# Patient Record
Sex: MALE | Race: BLACK OR AFRICAN AMERICAN | Employment: OTHER | URBAN - METROPOLITAN AREA
[De-identification: names, ages, dates, MRNs, and addresses within clinical notes are randomized per-mention and may not be internally consistent; named-entity substitution may affect disease eponyms.]

---

## 2017-01-01 ENCOUNTER — OFFICE VISIT (OUTPATIENT)
Dept: GASTROENTEROLOGY | Facility: CLINIC | Age: 72
End: 2017-01-01
Attending: INTERNAL MEDICINE
Payer: MEDICARE

## 2017-01-01 ENCOUNTER — RESULTS ONLY (OUTPATIENT)
Dept: LAB | Age: 72
End: 2017-01-01

## 2017-01-01 ENCOUNTER — OFFICE VISIT (OUTPATIENT)
Dept: TRANSPLANT | Facility: CLINIC | Age: 72
End: 2017-01-01
Attending: INTERNAL MEDICINE
Payer: MEDICARE

## 2017-01-01 ENCOUNTER — OFFICE VISIT (OUTPATIENT)
Dept: TRANSPLANT | Facility: CLINIC | Age: 72
End: 2017-01-01
Attending: NURSE PRACTITIONER
Payer: MEDICARE

## 2017-01-01 ENCOUNTER — OFFICE VISIT - HEALTHEAST (OUTPATIENT)
Dept: GERIATRICS | Facility: CLINIC | Age: 72
End: 2017-01-01

## 2017-01-01 VITALS
OXYGEN SATURATION: 98 % | TEMPERATURE: 98.4 F | RESPIRATION RATE: 18 BRPM | SYSTOLIC BLOOD PRESSURE: 113 MMHG | DIASTOLIC BLOOD PRESSURE: 72 MMHG | HEART RATE: 98 BPM

## 2017-01-01 VITALS
RESPIRATION RATE: 14 BRPM | OXYGEN SATURATION: 99 % | SYSTOLIC BLOOD PRESSURE: 100 MMHG | TEMPERATURE: 98.3 F | DIASTOLIC BLOOD PRESSURE: 55 MMHG | HEART RATE: 63 BPM

## 2017-01-01 VITALS
RESPIRATION RATE: 16 BRPM | OXYGEN SATURATION: 97 % | TEMPERATURE: 98.8 F | DIASTOLIC BLOOD PRESSURE: 60 MMHG | HEART RATE: 69 BPM | SYSTOLIC BLOOD PRESSURE: 97 MMHG

## 2017-01-01 DIAGNOSIS — E78.5 HYPERLIPIDEMIA: ICD-10-CM

## 2017-01-01 DIAGNOSIS — I10 HYPERTENSION: ICD-10-CM

## 2017-01-01 DIAGNOSIS — Z51.81 ANTICOAGULATION GOAL OF INR 1.5 TO 2.5: Primary | ICD-10-CM

## 2017-01-01 DIAGNOSIS — Z79.01 ANTICOAGULATION GOAL OF INR 1.5 TO 2.5: Primary | ICD-10-CM

## 2017-01-01 DIAGNOSIS — B18.1 CHRONIC VIRAL HEPATITIS B WITHOUT DELTA AGENT AND WITHOUT COMA (H): Primary | ICD-10-CM

## 2017-01-01 DIAGNOSIS — B18.1 CHRONIC VIRAL HEPATITIS B WITHOUT DELTA AGENT AND WITHOUT COMA (H): ICD-10-CM

## 2017-01-01 DIAGNOSIS — B19.10 HEPATITIS B: ICD-10-CM

## 2017-01-01 DIAGNOSIS — C93.10 CHRONIC MYELOMONOCYTIC LEUKEMIA (H): ICD-10-CM

## 2017-01-01 DIAGNOSIS — F03.90 DEMENTIA (H): ICD-10-CM

## 2017-01-01 DIAGNOSIS — C93.12 CHRONIC MYELOMONOCYTIC LEUKEMIA, IN RELAPSE (H): Primary | ICD-10-CM

## 2017-01-01 DIAGNOSIS — E11.40 DIABETES MELLITUS WITH NEUROPATHY (H): ICD-10-CM

## 2017-01-01 DIAGNOSIS — D46.9 MDS (MYELODYSPLASTIC SYNDROME) (H): ICD-10-CM

## 2017-01-01 LAB
ALBUMIN SERPL-MCNC: 3.1 G/DL (ref 3.4–5)
ALP SERPL-CCNC: 89 U/L (ref 40–150)
ALT SERPL W P-5'-P-CCNC: 20 U/L (ref 0–70)
ANION GAP SERPL CALCULATED.3IONS-SCNC: 7 MMOL/L (ref 3–14)
AST SERPL W P-5'-P-CCNC: 13 U/L (ref 0–45)
BILIRUB DIRECT SERPL-MCNC: 0.2 MG/DL (ref 0–0.2)
BILIRUB SERPL-MCNC: 1.6 MG/DL (ref 0.2–1.3)
BUN SERPL-MCNC: 16 MG/DL (ref 7–30)
CALCIUM SERPL-MCNC: 8.9 MG/DL (ref 8.5–10.1)
CHLORIDE SERPL-SCNC: 102 MMOL/L (ref 94–109)
CO2 SERPL-SCNC: 27 MMOL/L (ref 20–32)
COPATH REPORT: NORMAL
CREAT SERPL-MCNC: 0.8 MG/DL (ref 0.66–1.25)
ERYTHROCYTE [DISTWIDTH] IN BLOOD BY AUTOMATED COUNT: 13.1 % (ref 10–15)
ERYTHROCYTE [DISTWIDTH] IN BLOOD BY AUTOMATED COUNT: 14.1 % (ref 10–15)
GFR SERPL CREATININE-BSD FRML MDRD: >90 ML/MIN/1.7M2
GLUCOSE SERPL-MCNC: 190 MG/DL (ref 70–99)
HBV DNA SERPL NAA+PROBE-ACNC: NORMAL [IU]/ML
HBV DNA SERPL NAA+PROBE-LOG IU: NORMAL {LOG_IU}/ML
HCT VFR BLD AUTO: 39 % (ref 40–53)
HCT VFR BLD AUTO: 39.6 % (ref 40–53)
HGB BLD-MCNC: 12.5 G/DL (ref 13.3–17.7)
HGB BLD-MCNC: 13.1 G/DL (ref 13.3–17.7)
INR PPP: 1.19 (ref 0.86–1.14)
INR PPP: 1.68 (ref 0.86–1.14)
MCH RBC QN AUTO: 32.2 PG (ref 26.5–33)
MCH RBC QN AUTO: 34.7 PG (ref 26.5–33)
MCHC RBC AUTO-ENTMCNC: 32.1 G/DL (ref 31.5–36.5)
MCHC RBC AUTO-ENTMCNC: 33.1 G/DL (ref 31.5–36.5)
MCV RBC AUTO: 101 FL (ref 78–100)
MCV RBC AUTO: 105 FL (ref 78–100)
PLATELET # BLD AUTO: 201 10E9/L (ref 150–450)
PLATELET # BLD AUTO: 216 10E9/L (ref 150–450)
POTASSIUM SERPL-SCNC: 4 MMOL/L (ref 3.4–5.3)
PROT SERPL-MCNC: 7.1 G/DL (ref 6.8–8.8)
RBC # BLD AUTO: 3.78 10E12/L (ref 4.4–5.9)
RBC # BLD AUTO: 3.88 10E12/L (ref 4.4–5.9)
SODIUM SERPL-SCNC: 136 MMOL/L (ref 133–144)
WBC # BLD AUTO: 6.2 10E9/L (ref 4–11)
WBC # BLD AUTO: 6.9 10E9/L (ref 4–11)

## 2017-01-01 PROCEDURE — 88237 TISSUE CULTURE BONE MARROW: CPT | Performed by: INTERNAL MEDICINE

## 2017-01-01 PROCEDURE — 88305 TISSUE EXAM BY PATHOLOGIST: CPT | Performed by: INTERNAL MEDICINE

## 2017-01-01 PROCEDURE — 90713 POLIOVIRUS IPV SC/IM: CPT | Mod: ZF | Performed by: INTERNAL MEDICINE

## 2017-01-01 PROCEDURE — 81267 CHIMERISM ANAL NO CELL SELEC: CPT | Performed by: INTERNAL MEDICINE

## 2017-01-01 PROCEDURE — 38221 DX BONE MARROW BIOPSIES: CPT

## 2017-01-01 PROCEDURE — 40001005 ZZHCL STATISTIC FLOW >15 ABY TC 88189: Performed by: INTERNAL MEDICINE

## 2017-01-01 PROCEDURE — 90716 VAR VACCINE LIVE SUBQ: CPT | Mod: ZF | Performed by: INTERNAL MEDICINE

## 2017-01-01 PROCEDURE — 88161 CYTOPATH SMEAR OTHER SOURCE: CPT | Mod: XU | Performed by: INTERNAL MEDICINE

## 2017-01-01 PROCEDURE — 90670 PCV13 VACCINE IM: CPT | Mod: ZF | Performed by: INTERNAL MEDICINE

## 2017-01-01 PROCEDURE — 40000424 ZZHCL STATISTIC BONE MARROW CORE PERF TC 38221: Performed by: INTERNAL MEDICINE

## 2017-01-01 PROCEDURE — 40000611 ZZHCL STATISTIC MORPHOLOGY W/INTERP HEMEPATH TC 85060: Performed by: INTERNAL MEDICINE

## 2017-01-01 PROCEDURE — 88280 CHROMOSOME KARYOTYPE STUDY: CPT | Performed by: INTERNAL MEDICINE

## 2017-01-01 PROCEDURE — 40000951 ZZHCL STATISTIC BONE MARROW INTERP TC 85097: Performed by: INTERNAL MEDICINE

## 2017-01-01 PROCEDURE — 90707 MMR VACCINE SC: CPT | Mod: ZF | Performed by: INTERNAL MEDICINE

## 2017-01-01 PROCEDURE — 85027 COMPLETE CBC AUTOMATED: CPT | Performed by: INTERNAL MEDICINE

## 2017-01-01 PROCEDURE — 80076 HEPATIC FUNCTION PANEL: CPT | Performed by: INTERNAL MEDICINE

## 2017-01-01 PROCEDURE — G0364 BONE MARROW ASPIRATE &BIOPSY: HCPCS

## 2017-01-01 PROCEDURE — 85610 PROTHROMBIN TIME: CPT | Performed by: INTERNAL MEDICINE

## 2017-01-01 PROCEDURE — 88264 CHROMOSOME ANALYSIS 20-25: CPT | Performed by: INTERNAL MEDICINE

## 2017-01-01 PROCEDURE — 25000128 H RX IP 250 OP 636: Mod: ZF | Performed by: INTERNAL MEDICINE

## 2017-01-01 PROCEDURE — 36415 COLL VENOUS BLD VENIPUNCTURE: CPT | Performed by: INTERNAL MEDICINE

## 2017-01-01 PROCEDURE — 90472 IMMUNIZATION ADMIN EACH ADD: CPT

## 2017-01-01 PROCEDURE — 25000125 ZZHC RX 250: Mod: ZF | Performed by: INTERNAL MEDICINE

## 2017-01-01 PROCEDURE — G0009 ADMIN PNEUMOCOCCAL VACCINE: HCPCS

## 2017-01-01 PROCEDURE — 90715 TDAP VACCINE 7 YRS/> IM: CPT | Mod: ZF | Performed by: INTERNAL MEDICINE

## 2017-01-01 PROCEDURE — 00000058 ZZHCL STATISTIC BONE MARROW ASP PERF TC 38220: Performed by: INTERNAL MEDICINE

## 2017-01-01 PROCEDURE — 88185 FLOWCYTOMETRY/TC ADD-ON: CPT | Performed by: INTERNAL MEDICINE

## 2017-01-01 PROCEDURE — 99213 OFFICE O/P EST LOW 20 MIN: CPT | Mod: ZF

## 2017-01-01 PROCEDURE — 88311 DECALCIFY TISSUE: CPT | Performed by: INTERNAL MEDICINE

## 2017-01-01 PROCEDURE — 80048 BASIC METABOLIC PNL TOTAL CA: CPT | Performed by: INTERNAL MEDICINE

## 2017-01-01 PROCEDURE — 99212 OFFICE O/P EST SF 10 MIN: CPT | Mod: ZF

## 2017-01-01 PROCEDURE — 88184 FLOWCYTOMETRY/ TC 1 MARKER: CPT | Performed by: INTERNAL MEDICINE

## 2017-01-01 PROCEDURE — 90648 HIB PRP-T VACCINE 4 DOSE IM: CPT | Mod: ZF | Performed by: INTERNAL MEDICINE

## 2017-01-01 PROCEDURE — 85610 PROTHROMBIN TIME: CPT | Performed by: NURSE PRACTITIONER

## 2017-01-01 PROCEDURE — 88271 CYTOGENETICS DNA PROBE: CPT | Performed by: INTERNAL MEDICINE

## 2017-01-01 PROCEDURE — 88275 CYTOGENETICS 100-300: CPT | Performed by: INTERNAL MEDICINE

## 2017-01-01 PROCEDURE — 99212 OFFICE O/P EST SF 10 MIN: CPT | Mod: 25

## 2017-01-01 PROCEDURE — 00000161 ZZHCL STATISTIC H-SPHEME PROCESS B/S: Performed by: INTERNAL MEDICINE

## 2017-01-01 PROCEDURE — 87517 HEPATITIS B DNA QUANT: CPT | Performed by: INTERNAL MEDICINE

## 2017-01-01 RX ADMIN — HAEMOPHILUS B POLYSACCHARIDE CONJUGATE VACCINE FOR INJ 0.5 ML: RECON SOLN at 10:26

## 2017-01-01 RX ADMIN — VARICELLA VIRUS VACCINE LIVE 0.5 ML: 1350 INJECTION, POWDER, LYOPHILIZED, FOR SUSPENSION SUBCUTANEOUS at 10:20

## 2017-01-01 RX ADMIN — POLIOVIRUS TYPE 1 ANTIGEN (FORMALDEHYDE INACTIVATED), POLIOVIRUS TYPE 2 ANTIGEN (FORMALDEHYDE INACTIVATED), AND POLIOVIRUS TYPE 3 ANTIGEN (FORMALDEHYDE INACTIVATED) 0.5 ML: 40; 8; 32 INJECTION, SUSPENSION INTRAMUSCULAR at 10:24

## 2017-01-01 RX ADMIN — PNEUMOCOCCAL 13-VALENT CONJUGATE VACCINE 0.5 ML: 2.2; 2.2; 2.2; 2.2; 2.2; 4.4; 2.2; 2.2; 2.2; 2.2; 2.2; 2.2; 2.2 INJECTION, SUSPENSION INTRAMUSCULAR at 10:22

## 2017-01-01 RX ADMIN — TETANUS TOXOID, REDUCED DIPHTHERIA TOXOID AND ACELLULAR PERTUSSIS VACCINE, ADSORBED 0.5 ML: 5; 2.5; 8; 8; 2.5 SUSPENSION INTRAMUSCULAR at 10:23

## 2017-01-01 RX ADMIN — MEASLES, MUMPS, AND RUBELLA VIRUS VACCINE LIVE 0.5 ML: 1000; 12500; 1000 INJECTION, POWDER, LYOPHILIZED, FOR SUSPENSION SUBCUTANEOUS at 10:28

## 2017-01-01 ASSESSMENT — PAIN SCALES - GENERAL
PAINLEVEL: NO PAIN (0)

## 2017-01-18 ENCOUNTER — OFFICE VISIT (OUTPATIENT)
Dept: ENDOCRINOLOGY | Facility: CLINIC | Age: 72
End: 2017-01-18

## 2017-01-18 VITALS — HEART RATE: 65 BPM | DIASTOLIC BLOOD PRESSURE: 66 MMHG | SYSTOLIC BLOOD PRESSURE: 118 MMHG

## 2017-01-18 DIAGNOSIS — E11.65 TYPE 2 DIABETES MELLITUS WITH HYPERGLYCEMIA, WITH LONG-TERM CURRENT USE OF INSULIN (H): Primary | ICD-10-CM

## 2017-01-18 DIAGNOSIS — Z79.4 TYPE 2 DIABETES MELLITUS WITH HYPERGLYCEMIA, WITH LONG-TERM CURRENT USE OF INSULIN (H): Primary | ICD-10-CM

## 2017-01-18 ASSESSMENT — PAIN SCALES - GENERAL: PAINLEVEL: NO PAIN (0)

## 2017-01-18 NOTE — MR AVS SNAPSHOT
After Visit Summary   1/18/2017    Garcia Lara    MRN: [id number]           Patient Information     Date Of Birth          1945        Visit Information        Provider Department      1/18/2017 12:00 PM Cherry Crowder PA-C [facility]         Follow-ups after your visit        Your next 10 appointments already scheduled     Mar 15, 2017 11:00 AM   (Arrive by 10:30 AM)   Return General Liver with MD MARGARITA Herrera [facility] Hepatology ([facility])    [address] [place] [id number] [phone]            Apr 18, 2017 11:30 AM   (Arrive by 11:15 AM)   RETURN DIABETES with KENNETH [facility] Endocrinology ([facility])    [address] [place] [id number] [phone]              Who to contact     Please call your clinic at [phone] to:    Ask questions about your health    Make or cancel appointments    Discuss your medicines    Learn about your test results    Speak to your doctor   If you have compliments or concerns about an experience at your clinic, or if you wish to file a complaint, please contact [facility] Patient Relations at [phone] or email us at [email or web address]         Additional Information About Your Visit        Care EveryWhere ID     This is your Care EveryWhere ID. This could be used by other organizations to access your [place] medical records  [id number]        Your Vitals Were     Pulse                   65            Blood Pressure from Last 3 Encounters:   01/18/17 118/66   12/12/16 107/71   11/15/16 129/65    Weight from Last 3 Encounters:   12/12/16 112.492 kg (248 lb)   09/16/16 109.77 kg (242 lb)   08/29/16 109.7 kg (241 lb 13.5 oz)              Today, you had the following     No orders found for display         Today's Medication Changes          These changes  are accurate as of: 1/18/17 12:41 PM.  If you have any questions, ask your nurse or doctor.               These medicines have changed or have updated prescriptions.        Dose/Directions    magnesium oxide 400 MG tablet   Commonly known as:  MAG-OX   This may have changed:  how much to take   Used for:  Hypomagnesemia        Dose:  800 mg   Take 2 tablets (800 mg) by mouth 3 times daily   Quantity:  180 tablet   Refills:  3                Primary Care Provider Office Phone # Fax #    Arnulfo Dey [phone]       [place] [address] [address] [facility] [address]        Thank you!     Thank you for choosing [facility] ENDOCRINOLOG[facility]  for your care. Our goal is always to provide you with excellent care. Hearing back from our patients is one way we can continue to improve our services. Please take a few minutes to complete the written survey that you may receive in the mail after your visit with us. Thank you!             Your Updated Medication List - Protect others around you: Learn how to safely use, store and throw away your medicines at www.disposemymeds.org.          This list is accurate as of: 1/18/17 12:41 PM.  Always use your most recent med list.                   Brand Name Dispense Instructions for use    * ACCU-CHEK SMARTVIEW test strip   Generic drug:  blood glucose monitoring      by In Vitro route 4 times daily Use to test blood sugar 4 times daily or as directed.       * blood glucose monitoring test strip    ACCU-CHEK JASON PLUS    150 each    Use to test blood sugar 4 times daily or as directed.  Ok to substitute alternative if insurance prefers.       ALDACTONE PO      Take 25 mg by mouth daily       Alpha Lipoic Acid 200 MG Caps      Take 2 capsules by mouth daily       blood glucose lancing device      Device to be used with lancets.       blood glucose monitoring lancets     1 Box    Use to test blood sugar 4 times daily or as directed.       calcium polycarbophil 625 MG  "tablet    FIBERCON     Take 2 tablets by mouth 2 times daily       carboxymethylcellulose 1 % ophthalmic solution    CELLUVISC/REFRESH LIQUIGEL    1 Bottle    Place 1 drop into both eyes 3 times daily       ciclopirox 0.77 % cream    LOPROX    90 g    Apply topically 2 times daily To feet and toenails.       COLACE PO      Take 50 mg by mouth 2 times daily       fiber modular packet      2 times daily (with meals)       * LASIX PO      Take 40 mg by mouth At noon       * furosemide 80 MG tablet    LASIX    30 tablet    Take 1 tablet (80 mg) by mouth daily       glucose 40 % Gel gel      Take 15-30 g by mouth every 15 minutes as needed for low blood sugar       insulin glargine 100 UNIT/ML injection    LANTUS    3 mL    Inject 50 Units Subcutaneous At Bedtime       * insulin lispro 100 UNIT/ML injection    HumaLOG KWIKpen    3 mL    Dosing per separately attached sliding scale.       * HumaLOG KWIKpen 100 UNIT/ML injection   Generic drug:  insulin lispro      Novolog 9 units with each meal ( based on 1 unit per 5 grams of CHO)       magnesium oxide 400 MG tablet    MAG-OX    180 tablet    Take 2 tablets (800 mg) by mouth 3 times daily       metoprolol 50 MG tablet    LOPRESSOR     Take 75 mg in a.m. and 50mg in pm.       multivitamins with minerals Liqd liquid     1 Bottle    15 mLs by Per Feeding Tube route daily       OXYCODONE HCL PO      Take 5 mg by mouth every 6 hours as needed (1/2 pill)       pen needles 5/16\" 31G X 8 MM Misc          PROTONIX 40 MG EC tablet   Generic drug:  pantoprazole      Take 40 mg by mouth daily       sennosides 8.6 MG tablet    SENOKOT     Take 1 tablet by mouth daily       tenofovir 300 MG tablet    VIREAD    30 tablet    Take 1 tablet (300 mg) by mouth daily       traMADol 50 MG tablet    ULTRAM    28 tablet    Take 2 tablets (100 mg) by mouth every 8 hours as needed for moderate pain       warfarin 10 MG tablet    COUMADIN    30 tablet    Take 0.5 tablets (5 mg) by mouth daily    "    * Notice:  This list has 6 medication(s) that are the same as other medications prescribed for you. Read the directions carefully, and ask your doctor or other care provider to review them with you.

## 2017-01-18 NOTE — PROGRESS NOTES
HPI  Garcia Lara is a 71 year old male with type 2 diabetes mellitus here today for a follow up visit.  Pt's wife is present today and pt currently resides at [facility] in [place].  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   He was hospitalized last fall and found to have hepatitis B.  For his diabetes, he is on Lantus 50 units SQ at , Humalog 9 units with meals with correction scale ( 1 unit/25 for BG > 140 ).  His A1C is 7.4 % today.  I reviewed his blood sugar logsheet today and most of his bs are < 200 and no blood sugar values less than 70.  On ROS today, he has no complaints today.  He is being seen for a right foot wound.  No fevers or chills.  He denies visual changes, n/v, SOB at rest or cough.  No chest pain, abd pain or diarrhea.  He has numbness in both feet from his neuropathy.    ROS  Please see under HPI.    Allergies  Allergies   Allergen Reactions     Vancomycin Swelling     Patient developed lip swelling ~1-2 days after vancomycin was started at [place], repeated with test dose, lips swelled       Medications  Current Outpatient Prescriptions   Medication Sig Dispense Refill     OXYCODONE HCL PO Take 5 mg by mouth every 6 hours as needed (1/2 pill)       traMADol (ULTRAM) 50 MG tablet Take 2 tablets (100 mg) by mouth every 8 hours as needed for moderate pain 28 tablet 1     insulin lispro (HUMALOG KWIKPEN) 100 UNIT/ML soln Dosing per separately attached sliding scale. 3 mL 11     insulin glargine (LANTUS) 100 UNIT/ML PEN Inject 50 Units Subcutaneous At Bedtime 3 mL 11     insulin lispro (HUMALOG KWIKPEN) 100 UNIT/ML soln Novolog 9 units with each meal ( based on 1 unit per 5 grams of CHO)        tenofovir (VIREAD) 300 MG tablet Take 1 tablet (300 mg) by mouth daily 30 tablet 3     warfarin (COUMADIN) **10 MG** tablet  "Take 0.5 tablets (5 mg) by mouth daily 30 tablet 3     calcium polycarbophil (FIBERCON) 625 MG tablet Take 2 tablets by mouth 2 times daily       Alpha Lipoic Acid 200 MG CAPS Take 2 capsules by mouth daily       metoprolol (LOPRESSOR) 50 MG tablet Take 75 mg in a.m. and 50mg in pm.       pantoprazole (PROTONIX) 40 MG enteric coated tablet Take 40 mg by mouth daily       Furosemide (LASIX PO) Take 40 mg by mouth At noon       Spironolactone (ALDACTONE PO) Take 25 mg by mouth daily       fiber modular (NUTRISOURCE FIBER) packet 2 times daily (with meals)       Docusate Sodium (COLACE PO) Take 50 mg by mouth 2 times daily       sennosides (SENOKOT) 8.6 MG tablet Take 1 tablet by mouth daily       blood glucose monitoring (ACCU-CHEK JASON PLUS) test strip Use to test blood sugar 4 times daily or as directed.  Ok to substitute alternative if insurance prefers. 150 each 6     blood glucose monitoring (SOFTCLIX) lancets Use to test blood sugar 4 times daily or as directed. 1 Box 6     ciclopirox (LOPROX) 0.77 % cream Apply topically 2 times daily To feet and toenails. 90 g 6     magnesium oxide (MAG-OX) 400 MG tablet Take 2 tablets (800 mg) by mouth 3 times daily (Patient taking differently: Take 400 mg by mouth 3 times daily ) 180 tablet 3     furosemide (LASIX) 80 MG tablet Take 1 tablet (80 mg) by mouth daily 30 tablet 3     multivitamins with minerals (CERTAVITE) LIQD 15 mLs by Per Feeding Tube route daily 1 Bottle 3     carboxymethylcellulose (CELLUVISC/REFRESH LIQUIGEL) 1 % ophthalmic solution Place 1 drop into both eyes 3 times daily 1 Bottle 3     glucose 40 % GEL Take 15-30 g by mouth every 15 minutes as needed for low blood sugar       blood glucose (ACCU-CHEK SMARTVIEW) test strip by In Vitro route 4 times daily Use to test blood sugar 4 times daily or as directed.       blood glucose (ACCU-CHEK FASTCLIX) lancing device Device to be used with lancets.       Insulin Pen Needle (PEN NEEDLES 5/16\") 31G X 8 MM MISC "          Family History  family history includes Asthma in his sister and son; Coronary Artery Disease in his brother and brother; Prostate Cancer in his brother. There is no history of Liver Disease.    Social History  Smoke: none.  ETOH: none.  .  He resides in a care center.    Past Medical History  Past Medical History   Diagnosis Date     CMML (chronic myelomonocytic leukaemia) 2014     Hypertension      Hyperlipidemia      Type 2 diabetes mellitus (H) 1995     on insulin     Diabetic peripheral neuropathy associated with type 2 diabetes mellitus (H)      H/O agent Orange exposure      during [place]     H/O angina pectoris 1986     no further treatment necessary     Pneumonia 12/2013     required hospitalization     Metabolic encephalopathy 8/31/2015     Chronic hepatitis B (H)      H/O stem cell transplant (H) 8/2015     H/O deep venous thrombosis      Past Surgical History   Procedure Laterality Date     Hc pressure gradient measurement, initial vessel  1986     Colonoscopy         Physical Exam  /66 mmHg  Pulse 65  There is no weight on file to calculate BMI.    RESULTS  CREATININE   Date Value Ref Range Status   12/12/2016 0.90 0.66 - 1.25 mg/dL Final   07/10/2015 0.92 0.66 - 1.25 mg/dL Final     GFR ESTIMATE   Date Value Ref Range Status   12/12/2016 83 >60 mL/min/1.7m2 Final     Comment:     Non  GFR Calc     HEMOGLOBIN A1C   Date Value Ref Range Status   03/03/2016 7.3* 4.3 - 6.0 % Final     POTASSIUM   Date Value Ref Range Status   12/12/2016 3.5 3.4 - 5.3 mmol/L Final     ALT   Date Value Ref Range Status   12/12/2016 31 0 - 70 U/L Final     AST   Date Value Ref Range Status   12/12/2016 24 0 - 45 U/L Final     TSH   Date Value Ref Range Status   09/16/2016 1.30 mcU/mL Final     T4 FREE   Date Value Ref Range Status   09/07/2015 1.26 0.76 - 1.46 ng/dL Final       CHOLESTEROL   Date Value Ref Range Status   09/16/2016 163 mg/dL Final     HDL CHOLESTEROL   Date Value  Ref Range Status   09/16/2016 29 mg/dL Final     LDL CHOLESTEROL CALCULATED   Date Value Ref Range Status   09/16/2016 84 mg/dL Final     TRIGLYCERIDES   Date Value Ref Range Status   10/06/2016 152* <150 mg/dL Final     Comment:     Borderline high:  150-199 mg/dl   High:             200-499 mg/dl   Very high:       >499 mg/dl     09/28/2016 212* <150 mg/dL Final     Comment:     Borderline high:  150-199 mg/dl   High:             200-499 mg/dl   Very high:       >499 mg/dl       A1C      7.4   1/18/2017  A1C      6.5   6/23/2016  A1C      7.3   3/3/2016  A1C      7.3   9/6/2015    ASSESSMENT/PLAN:    1.  TYPE 2 DIABETES MELLITUS: Type 2 diabetes mellitus complicated by peripheral neuropathy and right foot wound.  Will continue current insulin doses.  He requires skilled nursing care to help care for him at this time.  Will refer for Oph exam.  Pt is normotensive.  Most recent creat was 0.90 with GFR 83 mL/min in Dec 2016.  Pt's TSH was normal in 9/2016.  Pt's LDL was 84 in 9/2016.  Will plan to check urine microalbuminuria next visit.    2. NEUROPATHY/FOOT CARE: Pt is being followed for right foot wound.    3.  HTN:Stable on current Rxs.    4.  MDS: S/P bone marrow transplant doing well.    5.  HEPATITIS B: Recent dx and being followed here.    6.  Return to Endocrine Clinic to see me in 3 months.

## 2017-01-18 NOTE — Clinical Note
1/18/2017       RE: Garcia Lara  [address]  [place] [address]     Dear Colleague,    Thank you for referring your patient, Garcia Lara, to the [facility] ENDOCRINOLOGY at [facility]. Please see a copy of my visit note below.    HPI  Garcia Lara is a 71 year old male with type 2 diabetes mellitus here today for a follow up visit.  Pt's wife is present today and pt currently resides at [facility] in [place].  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   He was hospitalized last fall and found to have hepatitis B.  For his diabetes, he is on Lantus 50 units SQ at hs, Humalog 9 units with meals with correction scale ( 1 unit/25 for BG > 140 ).  His A1C is 7.4 % today.  I reviewed his blood sugar logsheet today and most of his bs are < 200 and no blood sugar values less than 70.  On ROS today, he has no complaints today.  He is being seen for a right foot wound.  No fevers or chills.  He denies visual changes, n/v, SOB at rest or cough.  No chest pain, abd pain or diarrhea.  He has numbness in both feet from his neuropathy.    ROS  Please see under HPI.    Allergies  Allergies   Allergen Reactions     Vancomycin Swelling     Patient developed lip swelling ~1-2 days after vancomycin was started at [place], repeated with test dose, lips swelled       Medications  Current Outpatient Prescriptions   Medication Sig Dispense Refill     OXYCODONE HCL PO Take 5 mg by mouth every 6 hours as needed (1/2 pill)       traMADol (ULTRAM) 50 MG tablet Take 2 tablets (100 mg) by mouth every 8 hours as needed for moderate pain 28 tablet 1     insulin lispro (HUMALOG KWIKPEN) 100 UNIT/ML soln Dosing per separately attached sliding scale. 3 mL 11     insulin glargine (LANTUS) 100 UNIT/ML PEN Inject 50 Units  Subcutaneous At Bedtime 3 mL 11     insulin lispro (HUMALOG KWIKPEN) 100 UNIT/ML soln Novolog 9 units with each meal ( based on 1 unit per 5 grams of CHO)        tenofovir (VIREAD) 300 MG tablet Take 1 tablet (300 mg) by mouth daily 30 tablet 3     warfarin (COUMADIN) **10 MG** tablet Take 0.5 tablets (5 mg) by mouth daily 30 tablet 3     calcium polycarbophil (FIBERCON) 625 MG tablet Take 2 tablets by mouth 2 times daily       Alpha Lipoic Acid 200 MG CAPS Take 2 capsules by mouth daily       metoprolol (LOPRESSOR) 50 MG tablet Take 75 mg in a.m. and 50mg in pm.       pantoprazole (PROTONIX) 40 MG enteric coated tablet Take 40 mg by mouth daily       Furosemide (LASIX PO) Take 40 mg by mouth At noon       Spironolactone (ALDACTONE PO) Take 25 mg by mouth daily       fiber modular (NUTRISOURCE FIBER) packet 2 times daily (with meals)       Docusate Sodium (COLACE PO) Take 50 mg by mouth 2 times daily       sennosides (SENOKOT) 8.6 MG tablet Take 1 tablet by mouth daily       blood glucose monitoring (ACCU-CHEK JASON PLUS) test strip Use to test blood sugar 4 times daily or as directed.  Ok to substitute alternative if insurance prefers. 150 each 6     blood glucose monitoring (SOFTCLIX) lancets Use to test blood sugar 4 times daily or as directed. 1 Box 6     ciclopirox (LOPROX) 0.77 % cream Apply topically 2 times daily To feet and toenails. 90 g 6     magnesium oxide (MAG-OX) 400 MG tablet Take 2 tablets (800 mg) by mouth 3 times daily (Patient taking differently: Take 400 mg by mouth 3 times daily ) 180 tablet 3     furosemide (LASIX) 80 MG tablet Take 1 tablet (80 mg) by mouth daily 30 tablet 3     multivitamins with minerals (CERTAVITE) LIQD 15 mLs by Per Feeding Tube route daily 1 Bottle 3     carboxymethylcellulose (CELLUVISC/REFRESH LIQUIGEL) 1 % ophthalmic solution Place 1 drop into both eyes 3 times daily 1 Bottle 3     glucose 40 % GEL Take 15-30 g by mouth every 15 minutes as needed for low blood sugar    "    blood glucose (ACCU-CHEK SMARTVIEW) test strip by In Vitro route 4 times daily Use to test blood sugar 4 times daily or as directed.       blood glucose (ACCU-CHEK FASTCLIX) lancing device Device to be used with lancets.       Insulin Pen Needle (PEN NEEDLES 5/16\") 31G X 8 MM MISC          Family History  family history includes Asthma in his sister and son; Coronary Artery Disease in his brother and brother; Prostate Cancer in his brother. There is no history of Liver Disease.    Social History  Smoke: none.  ETOH: none.  .  He resides in a care center.    Past Medical History  Past Medical History   Diagnosis Date     CMML (chronic myelomonocytic leukaemia) 2014     Hypertension      Hyperlipidemia      Type 2 diabetes mellitus (H) 1995     on insulin     Diabetic peripheral neuropathy associated with type 2 diabetes mellitus (H)      H/O agent Orange exposure      during [place]     H/O angina pectoris 1986     no further treatment necessary     Pneumonia 12/2013     required hospitalization     Metabolic encephalopathy 8/31/2015     Chronic hepatitis B (H)      H/O stem cell transplant (H) 8/2015     H/O deep venous thrombosis      Past Surgical History   Procedure Laterality Date     Hc pressure gradient measurement, initial vessel  1986     Colonoscopy         Physical Exam  /66 mmHg  Pulse 65  There is no weight on file to calculate BMI.    RESULTS  CREATININE   Date Value Ref Range Status   12/12/2016 0.90 0.66 - 1.25 mg/dL Final   07/10/2015 0.92 0.66 - 1.25 mg/dL Final     GFR ESTIMATE   Date Value Ref Range Status   12/12/2016 83 >60 mL/min/1.7m2 Final     Comment:     Non  GFR Calc     HEMOGLOBIN A1C   Date Value Ref Range Status   03/03/2016 7.3* 4.3 - 6.0 % Final     POTASSIUM   Date Value Ref Range Status   12/12/2016 3.5 3.4 - 5.3 mmol/L Final     ALT   Date Value Ref Range Status   12/12/2016 31 0 - 70 U/L Final     AST   Date Value Ref Range Status   12/12/2016 " 24 0 - 45 U/L Final     TSH   Date Value Ref Range Status   09/16/2016 1.30 mcU/mL Final     T4 FREE   Date Value Ref Range Status   09/07/2015 1.26 0.76 - 1.46 ng/dL Final       CHOLESTEROL   Date Value Ref Range Status   09/16/2016 163 mg/dL Final     HDL CHOLESTEROL   Date Value Ref Range Status   09/16/2016 29 mg/dL Final     LDL CHOLESTEROL CALCULATED   Date Value Ref Range Status   09/16/2016 84 mg/dL Final     TRIGLYCERIDES   Date Value Ref Range Status   10/06/2016 152* <150 mg/dL Final     Comment:     Borderline high:  150-199 mg/dl   High:             200-499 mg/dl   Very high:       >499 mg/dl     09/28/2016 212* <150 mg/dL Final     Comment:     Borderline high:  150-199 mg/dl   High:             200-499 mg/dl   Very high:       >499 mg/dl       A1C      7.4   1/18/2017  A1C      6.5   6/23/2016  A1C      7.3   3/3/2016  A1C      7.3   9/6/2015    ASSESSMENT/PLAN:    1.  TYPE 2 DIABETES MELLITUS: Type 2 diabetes mellitus complicated by peripheral neuropathy and right foot wound.  Will continue current insulin doses.  He requires skilled nursing care to help care for him at this time.  Will refer for Oph exam.  Pt is normotensive.  Most recent creat was 0.90 with GFR 83 mL/min in Dec 2016.  Pt's TSH was normal in 9/2016.  Pt's LDL was 84 in 9/2016.  Will plan to check urine microalbuminuria next visit.    2. NEUROPATHY/FOOT CARE: Pt is being followed for right foot wound.    3.  HTN:Stable on current Rxs.    4.  MDS: S/P bone marrow transplant doing well.    5.  HEPATITIS B: Recent dx and being followed here.    6.  Return to Endocrine Clinic to see me in 3 month    Again, thank you for allowing me to participate in the care of your patient.      Sincerely,    Cherry Crowder PA-C

## 2017-01-18 NOTE — NURSING NOTE
Chief Complaint   Patient presents with     RECHECK     F/U TYPE II DM     Radha Ferro, [facility]  Endocrinology & Diabetes 3G

## 2017-02-22 DIAGNOSIS — B19.10 HEPATITIS B VIRUS INFECTION, UNSPECIFIED CHRONICITY: Primary | ICD-10-CM

## 2017-03-08 ENCOUNTER — PRE VISIT (OUTPATIENT)
Dept: GASTROENTEROLOGY | Facility: CLINIC | Age: 72
End: 2017-03-08

## 2017-03-08 NOTE — TELEPHONE ENCOUNTER
Was the patient contacted by phone and reminded of the upcoming visit? message left    Was the patient instructed to bring a current list of all medications to the appointment or instructed to bring in all medication bottles? Yes     Was the patient instructed to arrive prior to the appointment time to have ordered labs drawn? Yes     Were the needed lab orders placed? Yes    RODRICK CANAS CMA

## 2017-03-15 ENCOUNTER — OFFICE VISIT (OUTPATIENT)
Dept: GASTROENTEROLOGY | Facility: CLINIC | Age: 72
End: 2017-03-15
Attending: INTERNAL MEDICINE
Payer: MEDICARE

## 2017-03-15 VITALS
HEART RATE: 67 BPM | TEMPERATURE: 98.3 F | DIASTOLIC BLOOD PRESSURE: 71 MMHG | HEIGHT: 71 IN | OXYGEN SATURATION: 98 % | SYSTOLIC BLOOD PRESSURE: 117 MMHG

## 2017-03-15 DIAGNOSIS — G93.41 METABOLIC ENCEPHALOPATHY: ICD-10-CM

## 2017-03-15 DIAGNOSIS — B18.1 CHRONIC VIRAL HEPATITIS B WITHOUT DELTA AGENT AND WITHOUT COMA (H): ICD-10-CM

## 2017-03-15 DIAGNOSIS — B19.10 HEPATITIS B VIRUS INFECTION, UNSPECIFIED CHRONICITY: ICD-10-CM

## 2017-03-15 DIAGNOSIS — G93.41 METABOLIC ENCEPHALOPATHY: Primary | ICD-10-CM

## 2017-03-15 LAB
ALBUMIN SERPL-MCNC: 3 G/DL (ref 3.4–5)
ALP SERPL-CCNC: 114 U/L (ref 40–150)
ALT SERPL W P-5'-P-CCNC: 23 U/L (ref 0–70)
ANION GAP SERPL CALCULATED.3IONS-SCNC: 9 MMOL/L (ref 3–14)
AST SERPL W P-5'-P-CCNC: 18 U/L (ref 0–45)
BILIRUB DIRECT SERPL-MCNC: 0.2 MG/DL (ref 0–0.2)
BILIRUB SERPL-MCNC: 0.7 MG/DL (ref 0.2–1.3)
BUN SERPL-MCNC: 14 MG/DL (ref 7–30)
CALCIUM SERPL-MCNC: 8.6 MG/DL (ref 8.5–10.1)
CHLORIDE SERPL-SCNC: 101 MMOL/L (ref 94–109)
CO2 SERPL-SCNC: 28 MMOL/L (ref 20–32)
CREAT SERPL-MCNC: 0.76 MG/DL (ref 0.66–1.25)
ERYTHROCYTE [DISTWIDTH] IN BLOOD BY AUTOMATED COUNT: 12.9 % (ref 10–15)
GFR SERPL CREATININE-BSD FRML MDRD: ABNORMAL ML/MIN/1.7M2
GLUCOSE SERPL-MCNC: 313 MG/DL (ref 70–99)
HCT VFR BLD AUTO: 41.8 % (ref 40–53)
HGB BLD-MCNC: 13.9 G/DL (ref 13.3–17.7)
INR PPP: 2.43 (ref 0.86–1.14)
MAGNESIUM SERPL-MCNC: 1.8 MG/DL (ref 1.6–2.3)
MCH RBC QN AUTO: 33.2 PG (ref 26.5–33)
MCHC RBC AUTO-ENTMCNC: 33.3 G/DL (ref 31.5–36.5)
MCV RBC AUTO: 100 FL (ref 78–100)
PLATELET # BLD AUTO: 192 10E9/L (ref 150–450)
POTASSIUM SERPL-SCNC: 3.9 MMOL/L (ref 3.4–5.3)
PROT SERPL-MCNC: 6.9 G/DL (ref 6.8–8.8)
RBC # BLD AUTO: 4.19 10E12/L (ref 4.4–5.9)
SODIUM SERPL-SCNC: 138 MMOL/L (ref 133–144)
WBC # BLD AUTO: 7.4 10E9/L (ref 4–11)

## 2017-03-15 PROCEDURE — 85027 COMPLETE CBC AUTOMATED: CPT | Performed by: INTERNAL MEDICINE

## 2017-03-15 PROCEDURE — 82248 BILIRUBIN DIRECT: CPT | Performed by: INTERNAL MEDICINE

## 2017-03-15 PROCEDURE — 36415 COLL VENOUS BLD VENIPUNCTURE: CPT | Performed by: INTERNAL MEDICINE

## 2017-03-15 PROCEDURE — 83735 ASSAY OF MAGNESIUM: CPT | Performed by: INTERNAL MEDICINE

## 2017-03-15 PROCEDURE — 80053 COMPREHEN METABOLIC PANEL: CPT | Performed by: INTERNAL MEDICINE

## 2017-03-15 PROCEDURE — 99212 OFFICE O/P EST SF 10 MIN: CPT | Mod: ZF

## 2017-03-15 PROCEDURE — 85610 PROTHROMBIN TIME: CPT | Performed by: INTERNAL MEDICINE

## 2017-03-15 PROCEDURE — 87517 HEPATITIS B DNA QUANT: CPT | Performed by: INTERNAL MEDICINE

## 2017-03-15 ASSESSMENT — PAIN SCALES - GENERAL: PAINLEVEL: NO PAIN (0)

## 2017-03-15 NOTE — LETTER
3/15/2017       RE: Garcia Lara  [address]  [place] [address]     Dear Colleague,    Thank you for referring your patient, Garcia Lara, to the [facility] HEPATOLOGY at [facility]. Please see a copy of my visit note below.    [facility]    Hepatology follow-up    Follow-up visit for chronic hepatitis B    Subjective:  71 year old male    HBV  - dx Aug 2016  - no hx CHALO, IVDU or tattoos  - eAg (-), eAb (+)  - liver bx 11/15/16- ?possible cirrhosis  - HBV DNA 11/9/16= 318  - tenofovir since 9/17/16 for HBV reactivation    Patient presents to clinic this morning with his wife for follow-up of chronic hepatitis B.  Last clinic visit was Dec 2016.  At that time, liver function tests were checked and returned normal.  Patient has since moved to an assisted living facility for the winter.  He denies new medications, ER visits or hospital admissions.    Patient is well.  His wife reports that he still has intermittent confusion.  He denies any current confusion or lethargy.    Patient denies jaundice, lower extremity edema or abdominal distension.    Patient denies melena, hematemesis or hematochezia.    Patient denies fevers, sweats or chills.  Weight stable.    Patient does not drink alcohol.      Medical hx Surgical hx   Past Medical History   Diagnosis Date     Chronic hepatitis B (H)      CMML (chronic myelomonocytic leukaemia) 2014     Diabetic peripheral neuropathy associated with type 2 diabetes mellitus (H)      H/O agent Orange exposure      H/O angina pectoris 1986     H/O deep venous thrombosis      H/O stem cell transplant (H) 8/2015     Hyperlipidemia      Hypertension      Metabolic encephalopathy 8/31/2015     Pneumonia 12/2013     Type 2 diabetes mellitus (H) 1995      Past Surgical History   Procedure Laterality Date     Hc pressure gradient measurement, initial vessel  1986     Colonoscopy            Medications  Prior to  Admission medications    Medication Sig Start Date End Date Taking? Authorizing Provider   Incontinent Wash (ALOE VESTA 2-N-1 CLEANSER EX)    Yes Reported, Patient   OXYCODONE HCL PO Take 5 mg by mouth every 6 hours as needed (1/2 pill)   Yes Reported, Patient   traMADol (ULTRAM) 50 MG tablet Take 2 tablets (100 mg) by mouth every 8 hours as needed for moderate pain 10/17/16  Yes Omer Pickens DPM   insulin lispro (HUMALOG KWIKPEN) 100 UNIT/ML soln Dosing per separately attached sliding scale. 9/20/16  Yes Celsa Hoff PA-C   insulin glargine (LANTUS) 100 UNIT/ML PEN Inject 50 Units Subcutaneous At Bedtime 9/20/16  Yes Celsa Hoff PA-C   tenofovir (VIREAD) 300 MG tablet Take 1 tablet (300 mg) by mouth daily 9/19/16  Yes Celsa Hoff PA-C   warfarin (COUMADIN) **10 MG** tablet Take 6 mg by mouth daily Mon, wed, fri 9/19/16  Yes CarrierCelsa PA-C   calcium polycarbophil (FIBERCON) 625 MG tablet Take 2 tablets by mouth 2 times daily   Yes Reported, Patient   Alpha Lipoic Acid 200 MG CAPS Take 2 capsules by mouth daily   Yes Reported, Patient   metoprolol (LOPRESSOR) 50 MG tablet Take 75 mg in a.m. and 50mg in pm.   Yes Reported, Patient   pantoprazole (PROTONIX) 40 MG enteric coated tablet Take 40 mg by mouth daily   Yes Reported, Patient   Spironolactone (ALDACTONE PO) Take 25 mg by mouth daily   Yes Reported, Patient   fiber modular (NUTRISOURCE FIBER) packet 2 times daily (with meals)   Yes Reported, Patient   Docusate Sodium (COLACE PO) Take 50 mg by mouth 2 times daily   Yes Reported, Patient   sennosides (SENOKOT) 8.6 MG tablet Take 1 tablet by mouth daily   Yes Reported, Patient   blood glucose monitoring (ACCU-CHEK JASON PLUS) test strip Use to test blood sugar 4 times daily or as directed.  Ok to substitute alternative if insurance prefers. 6/28/16  Yes Cherry Crowder PA-C   blood glucose monitoring (SOFTCLIX) lancets Use to test blood sugar 4 times daily or as directed.  "6/28/16  Yes Cherry Crowder PA-C   ciclopirox (LOPROX) 0.77 % cream Apply topically 2 times daily To feet and toenails. 5/12/16  Yes Omer Pickens DPM   magnesium oxide (MAG-OX) 400 MG tablet Take 2 tablets (800 mg) by mouth 3 times daily  Patient taking differently: Take 400 mg by mouth 3 times daily  3/11/16  Yes Bertha Paniagua APRN CNP   furosemide (LASIX) 80 MG tablet Take 1 tablet (80 mg) by mouth daily 3/11/16  Yes Bertha Paniagua APRN CNP   multivitamins with minerals (CERTAVITE) LIQD 15 mLs by Per Feeding Tube route daily 3/11/16  Yes Bertha Paniagua APRN CNP   carboxymethylcellulose (CELLUVISC/REFRESH LIQUIGEL) 1 % ophthalmic solution Place 1 drop into both eyes 3 times daily 3/11/16  Yes Bertha Paniagua APRN CNP   glucose 40 % GEL Take 15-30 g by mouth every 15 minutes as needed for low blood sugar 12/15/15  Yes Celsa Hoff PA-C   blood glucose (ACCU-CHEK SMARTVIEW) test strip by In Vitro route 4 times daily Use to test blood sugar 4 times daily or as directed.   Yes Reported, Patient   blood glucose (ACCU-CHEK FASTCLIX) lancing device Device to be used with lancets.   Yes Reported, Patient   Insulin Pen Needle (PEN NEEDLES 5/16\") 31G X 8 MM MISC    Yes Reported, Patient       Allergies  Allergies   Allergen Reactions     Vancomycin Swelling     Patient developed lip swelling ~1-2 days after vancomycin was started at [place], repeated with test dose, lips swelled       Review of systems  A 10-point review of systems was negative    Examination  /71  Pulse 67  Temp 98.3  F (36.8  C) (Oral)  Ht 1.803 m (5' 11\")  SpO2 98%  There is no height or weight on file to calculate BMI.    Gen- well, NAD, A+Ox3, normal color  CVS- RRR  RS- CTA  Abd- obese, soft, non-tender, no ascites or organomegaly on palpation or percussion, BS+  Extr-hands normal, no KAYLIN  Skin- no rash or jaundice  Neuro- no asterixis  Psych- flat affect    Laboratory  Lab Results "   Component Value Date    BILITOTAL 1.3 12/12/2016    ALT 31 12/12/2016    AST 24 12/12/2016    ALKPHOS 103 12/12/2016     Nil recent    Radiology  Nil recent    Assessment  71 year old male with history of bone marrow transplant who presents to clinic for follow-up of chronic hepatitis B with possible cirrhosis on liver biopsy.  Liver function tests normal (at last check) on antiviral therapy.  Intermittent confusion may be related to liver disease so it is reasonable to start a trial of lactulose for possible hepatic encephalopathy.  Will consider screening for esophageal varices if blood work and ultrasound are consistent with cirrhosis.    Plan  1.  Check LFTs, HBV DNA  2.  Trial of lactulose 30mL PO BID for 2 weeks for confusion  3.  Continue tenofovir  4.  Abdominal U/S  5.  Follow-up in 6 months    Gabino Haas MD  Hepatology  [facility]

## 2017-03-15 NOTE — LETTER
3/15/2017      RE: Garcia Lara  [address]  [place] [address]       [facility]    Hepatology follow-up    Follow-up visit for chronic hepatitis B    Subjective:  71 year old male    HBV  - dx Aug 2016  - no hx CHALO, IVDU or tattoos  - eAg (-), eAb (+)  - liver bx 11/15/16- ?possible cirrhosis  - HBV DNA 11/9/16= 318  - tenofovir since 9/17/16 for HBV reactivation    Patient presents to clinic this morning with his wife for follow-up of chronic hepatitis B.  Last clinic visit was Dec 2016.  At that time, liver function tests were checked and returned normal.  Patient has since moved to an assisted living facility for the winter.  He denies new medications, ER visits or hospital admissions.    Patient is well.  His wife reports that he still has intermittent confusion.  He denies any current confusion or lethargy.    Patient denies jaundice, lower extremity edema or abdominal distension.    Patient denies melena, hematemesis or hematochezia.    Patient denies fevers, sweats or chills.  Weight stable.    Patient does not drink alcohol.      Medical hx Surgical hx   Past Medical History   Diagnosis Date     Chronic hepatitis B (H)      CMML (chronic myelomonocytic leukaemia) 2014     Diabetic peripheral neuropathy associated with type 2 diabetes mellitus (H)      H/O agent Orange exposure      H/O angina pectoris 1986     H/O deep venous thrombosis      H/O stem cell transplant (H) 8/2015     Hyperlipidemia      Hypertension      Metabolic encephalopathy 8/31/2015     Pneumonia 12/2013     Type 2 diabetes mellitus (H) 1995      Past Surgical History   Procedure Laterality Date     Hc pressure gradient measurement, initial vessel  1986     Colonoscopy            Medications  Prior to Admission medications    Medication Sig Start Date End Date Taking? Authorizing Provider   Incontinent Wash (ALOE VESTA 2-N-1 CLEANSER EX)    Yes Reported, Patient   OXYCODONE HCL PO Take 5 mg by  mouth every 6 hours as needed (1/2 pill)   Yes Reported, Patient   traMADol (ULTRAM) 50 MG tablet Take 2 tablets (100 mg) by mouth every 8 hours as needed for moderate pain 10/17/16  Yes Omer Pickens DPM   insulin lispro (HUMALOG KWIKPEN) 100 UNIT/ML soln Dosing per separately attached sliding scale. 9/20/16  Yes Celsa Hoff PA-C   insulin glargine (LANTUS) 100 UNIT/ML PEN Inject 50 Units Subcutaneous At Bedtime 9/20/16  Yes Celsa Hoff PA-C   tenofovir (VIREAD) 300 MG tablet Take 1 tablet (300 mg) by mouth daily 9/19/16  Yes Celsa Hoff PA-C   warfarin (COUMADIN) **10 MG** tablet Take 6 mg by mouth daily Mon, wed, fri 9/19/16  Yes Celsa Hoff PA-C   calcium polycarbophil (FIBERCON) 625 MG tablet Take 2 tablets by mouth 2 times daily   Yes Reported, Patient   Alpha Lipoic Acid 200 MG CAPS Take 2 capsules by mouth daily   Yes Reported, Patient   metoprolol (LOPRESSOR) 50 MG tablet Take 75 mg in a.m. and 50mg in pm.   Yes Reported, Patient   pantoprazole (PROTONIX) 40 MG enteric coated tablet Take 40 mg by mouth daily   Yes Reported, Patient   Spironolactone (ALDACTONE PO) Take 25 mg by mouth daily   Yes Reported, Patient   fiber modular (NUTRISOURCE FIBER) packet 2 times daily (with meals)   Yes Reported, Patient   Docusate Sodium (COLACE PO) Take 50 mg by mouth 2 times daily   Yes Reported, Patient   sennosides (SENOKOT) 8.6 MG tablet Take 1 tablet by mouth daily   Yes Reported, Patient   blood glucose monitoring (ACCU-CHEK JASON PLUS) test strip Use to test blood sugar 4 times daily or as directed.  Ok to substitute alternative if insurance prefers. 6/28/16  Yes Cherry Crowder PA-C   blood glucose monitoring (SOFTCLIX) lancets Use to test blood sugar 4 times daily or as directed. 6/28/16  Yes Cherry Crowder PA-C   ciclopirox (LOPROX) 0.77 % cream Apply topically 2 times daily To feet and toenails. 5/12/16  Yes Omer Pickens DPM   magnesium oxide (MAG-OX) 400 MG  "tablet Take 2 tablets (800 mg) by mouth 3 times daily  Patient taking differently: Take 400 mg by mouth 3 times daily  3/11/16  Yes Bertha Paniagua APRN CNP   furosemide (LASIX) 80 MG tablet Take 1 tablet (80 mg) by mouth daily 3/11/16  Yes Bertha Paniagua APRN CNP   multivitamins with minerals (CERTAVITE) LIQD 15 mLs by Per Feeding Tube route daily 3/11/16  Yes Bertha Paniagua APRN CNP   carboxymethylcellulose (CELLUVISC/REFRESH LIQUIGEL) 1 % ophthalmic solution Place 1 drop into both eyes 3 times daily 3/11/16  Yes Bertha Paniagua APRN CNP   glucose 40 % GEL Take 15-30 g by mouth every 15 minutes as needed for low blood sugar 12/15/15  Yes Celsa Hoff PA-C   blood glucose (ACCU-CHEK SMARTVIEW) test strip by In Vitro route 4 times daily Use to test blood sugar 4 times daily or as directed.   Yes Reported, Patient   blood glucose (ACCU-CHEK FASTCLIX) lancing device Device to be used with lancets.   Yes Reported, Patient   Insulin Pen Needle (PEN NEEDLES 5/16\") 31G X 8 MM MISC    Yes Reported, Patient       Allergies  Allergies   Allergen Reactions     Vancomycin Swelling     Patient developed lip swelling ~1-2 days after vancomycin was started at [place], repeated with test dose, lips swelled       Review of systems  A 10-point review of systems was negative    Examination  /71  Pulse 67  Temp 98.3  F (36.8  C) (Oral)  Ht 1.803 m (5' 11\")  SpO2 98%  There is no height or weight on file to calculate BMI.    Gen- well, NAD, A+Ox3, normal color  CVS- RRR  RS- CTA  Abd- obese, soft, non-tender, no ascites or organomegaly on palpation or percussion, BS+  Extr-hands normal, no KAYLIN  Skin- no rash or jaundice  Neuro- no asterixis  Psych- flat affect    Laboratory  Lab Results   Component Value Date    BILITOTAL 1.3 12/12/2016    ALT 31 12/12/2016    AST 24 12/12/2016    ALKPHOS 103 12/12/2016     Nil recent    Radiology  Nil recent    Assessment  71 year old male with history of " bone marrow transplant who presents to clinic for follow-up of chronic hepatitis B with possible cirrhosis on liver biopsy.  Liver function tests normal (at last check) on antiviral therapy.  Intermittent confusion may be related to liver disease so it is reasonable to start a trial of lactulose for possible hepatic encephalopathy.  Will consider screening for esophageal varices if blood work and ultrasound are consistent with cirrhosis.    Plan  1.  Check LFTs, HBV DNA  2.  Trial of lactulose 30mL PO BID for 2 weeks for confusion  3.  Continue tenofovir  4.  Abdominal U/S  5.  Follow-up in 6 months    Gabino Haas MD  Hepatology  [facility]

## 2017-03-15 NOTE — PROGRESS NOTES
[facility]    Hepatology follow-up    Follow-up visit for chronic hepatitis B    Subjective:  71 year old male    HBV  - dx Aug 2016  - no hx CHALO, IVDU or tattoos  - eAg (-), eAb (+)  - liver bx 11/15/16- ?possible cirrhosis  - HBV DNA 11/9/16= 318  - tenofovir since 9/17/16 for HBV reactivation    Patient presents to clinic this morning with his wife for follow-up of chronic hepatitis B.  Last clinic visit was Dec 2016.  At that time, liver function tests were checked and returned normal.  Patient has since moved to an assisted living facility for the winter.  He denies new medications, ER visits or hospital admissions.    Patient is well.  His wife reports that he still has intermittent confusion.  He denies any current confusion or lethargy.    Patient denies jaundice, lower extremity edema or abdominal distension.    Patient denies melena, hematemesis or hematochezia.    Patient denies fevers, sweats or chills.  Weight stable.    Patient does not drink alcohol.      Medical hx Surgical hx   Past Medical History   Diagnosis Date     Chronic hepatitis B (H)      CMML (chronic myelomonocytic leukaemia) 2014     Diabetic peripheral neuropathy associated with type 2 diabetes mellitus (H)      H/O agent Orange exposure      H/O angina pectoris 1986     H/O deep venous thrombosis      H/O stem cell transplant (H) 8/2015     Hyperlipidemia      Hypertension      Metabolic encephalopathy 8/31/2015     Pneumonia 12/2013     Type 2 diabetes mellitus (H) 1995      Past Surgical History   Procedure Laterality Date     Hc pressure gradient measurement, initial vessel  1986     Colonoscopy            Medications  Prior to Admission medications    Medication Sig Start Date End Date Taking? Authorizing Provider   Incontinent Wash (ALOE VESTA 2-N-1 CLEANSER EX)    Yes Reported, Patient   OXYCODONE HCL PO Take 5 mg by mouth every 6 hours as needed (1/2 pill)   Yes Reported, Patient   traMADol (ULTRAM)  50 MG tablet Take 2 tablets (100 mg) by mouth every 8 hours as needed for moderate pain 10/17/16  Yes Omer Pickens DPM   insulin lispro (HUMALOG KWIKPEN) 100 UNIT/ML soln Dosing per separately attached sliding scale. 9/20/16  Yes Celsa Hoff PA-C   insulin glargine (LANTUS) 100 UNIT/ML PEN Inject 50 Units Subcutaneous At Bedtime 9/20/16  Yes Kavya, Celsa BLISS PA-C   tenofovir (VIREAD) 300 MG tablet Take 1 tablet (300 mg) by mouth daily 9/19/16  Yes Celsa Hoff PA-C   warfarin (COUMADIN) **10 MG** tablet Take 6 mg by mouth daily Mon, wed, fri 9/19/16  Yes Carrier, Celsa BLISS PA-C   calcium polycarbophil (FIBERCON) 625 MG tablet Take 2 tablets by mouth 2 times daily   Yes Reported, Patient   Alpha Lipoic Acid 200 MG CAPS Take 2 capsules by mouth daily   Yes Reported, Patient   metoprolol (LOPRESSOR) 50 MG tablet Take 75 mg in a.m. and 50mg in pm.   Yes Reported, Patient   pantoprazole (PROTONIX) 40 MG enteric coated tablet Take 40 mg by mouth daily   Yes Reported, Patient   Spironolactone (ALDACTONE PO) Take 25 mg by mouth daily   Yes Reported, Patient   fiber modular (NUTRISOURCE FIBER) packet 2 times daily (with meals)   Yes Reported, Patient   Docusate Sodium (COLACE PO) Take 50 mg by mouth 2 times daily   Yes Reported, Patient   sennosides (SENOKOT) 8.6 MG tablet Take 1 tablet by mouth daily   Yes Reported, Patient   blood glucose monitoring (ACCU-CHEK JASON PLUS) test strip Use to test blood sugar 4 times daily or as directed.  Ok to substitute alternative if insurance prefers. 6/28/16  Yes Cherry Crowder PA-C   blood glucose monitoring (SOFTCLIX) lancets Use to test blood sugar 4 times daily or as directed. 6/28/16  Yes Cherry Crowder PA-C   ciclopirox (LOPROX) 0.77 % cream Apply topically 2 times daily To feet and toenails. 5/12/16  Yes Omer Pickens DPM   magnesium oxide (MAG-OX) 400 MG tablet Take 2 tablets (800 mg) by mouth 3 times daily  Patient taking differently:  "Take 400 mg by mouth 3 times daily  3/11/16  Yes Bertha Paniagua APRN CNP   furosemide (LASIX) 80 MG tablet Take 1 tablet (80 mg) by mouth daily 3/11/16  Yes Bertha Paniagua APRN CNP   multivitamins with minerals (CERTAVITE) LIQD 15 mLs by Per Feeding Tube route daily 3/11/16  Yes Bertha Paniagua APRN CNP   carboxymethylcellulose (CELLUVISC/REFRESH LIQUIGEL) 1 % ophthalmic solution Place 1 drop into both eyes 3 times daily 3/11/16  Yes Bertha Paniagua APRN CNP   glucose 40 % GEL Take 15-30 g by mouth every 15 minutes as needed for low blood sugar 12/15/15  Yes Celsa Hoff PA-C   blood glucose (ACCU-CHEK SMARTVIEW) test strip by In Vitro route 4 times daily Use to test blood sugar 4 times daily or as directed.   Yes Reported, Patient   blood glucose (ACCU-CHEK FASTCLIX) lancing device Device to be used with lancets.   Yes Reported, Patient   Insulin Pen Needle (PEN NEEDLES 5/16\") 31G X 8 MM MISC    Yes Reported, Patient       Allergies  Allergies   Allergen Reactions     Vancomycin Swelling     Patient developed lip swelling ~1-2 days after vancomycin was started at [place], repeated with test dose, lips swelled       Review of systems  A 10-point review of systems was negative    Examination  /71  Pulse 67  Temp 98.3  F (36.8  C) (Oral)  Ht 1.803 m (5' 11\")  SpO2 98%  There is no height or weight on file to calculate BMI.    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  Liver function tests normal (at last check) on antiviral therapy.  Intermittent confusion may be related to liver disease so it is reasonable to start a trial of lactulose for possible hepatic encephalopathy.  Will consider screening for esophageal varices if blood work and ultrasound are consistent with cirrhosis.    Plan  1.  Check LFTs, HBV DNA  2.  Trial of lactulose 30mL PO BID for 2 weeks for confusion  3.  Continue tenofovir  4.  Abdominal U/S  5.  Follow-up in 6 months    Gabino Haas MD  Hepatology  [facility]

## 2017-03-15 NOTE — NURSING NOTE
Chief Complaint   Patient presents with     RECHECK     Hepatitis B   Pt roomed, vitals, meds, and allergies reviewed with pt. Pt ready for provider.  Tian Garland, CMA

## 2017-03-15 NOTE — MR AVS SNAPSHOT
After Visit Summary   3/15/2017    Garcia Lara    MRN: [id number]           Patient Information     Date Of Birth          1945        Visit Information        Provider Department      3/15/2017 11:00 AM Gabino Berman MD [facility] Hepatology        Today's Diagnoses     Metabolic encephalopathy    -  1    Chronic viral hepatitis B without delta agent and without coma (H)           Follow-ups after your visit        Follow-up notes from your care team     Return in about 6 months (around 9/15/2017).      Your next 10 appointments already scheduled     Apr 18, 2017 11:30 AM CDT   (Arrive by 11:15 AM)   RETURN DIABETES with Cherry Crowder PA-C   [facility] Endocrinology ([facility])    [address]  [address] [facility] [address] [phone]              Future tests that were ordered for you today     Open Future Orders        Priority Expected Expires Ordered    US Abdomen Complete Routine  3/15/2018 3/15/2017            Who to contact     If you have questions or need follow up information about today's clinic visit or your schedule please contact [facility] HEPATOLOGY directly at [phone].  Normal or non-critical lab and imaging results will be communicated to you by MyChart, letter or phone within 4 business days after the clinic has received the results. If you do not hear from us within 7 days, please contact the clinic through Dattch[employer] or phone. If you have a critical or abnormal lab result, we will notify you by phone as soon as possible.  Submit refill requests through [employer] or call your pharmacy and they will forward the refill request to us. Please allow 3 business days for your refill to be completed.          Additional Information About Your Visit        MyChart Information     [employer] lets you send messages to your doctor, view your test results, renew your prescriptions, schedule appointments and more. To sign up, go to  "[email or web address] . Click on \"Log in\" on the left side of the screen, which will take you to the Welcome page. Then click on \"Sign up Now\" on the right side of the page.     You will be asked to enter the access code listed below, as well as some personal information. Please follow the directions to create your username and password.     Your access code is: [email or web address]  Expires: 2017  5:11 PM     Your access code will  in 90 days. If you need help or a new code, please call your [place] clinic or [phone].        Care EveryWhere ID     This is your Care EveryWhere ID. This could be used by other organizations to access your [place] medical records  [id number]        Your Vitals Were     Pulse Temperature Height Pulse Oximetry          67 98.3  F (36.8  C) (Oral) 1.803 m (5' 11\") 98%         Blood Pressure from Last 3 Encounters:   03/15/17 117/71   17 118/66   16 107/71    Weight from Last 3 Encounters:   16 112.5 kg (248 lb)   16 109.8 kg (242 lb)   16 109.7 kg (241 lb 13.5 oz)                 Today's Medication Changes          These changes are accurate as of: 3/15/17  5:11 PM.  If you have any questions, ask your nurse or doctor.               These medicines have changed or have updated prescriptions.        Dose/Directions    magnesium oxide 400 MG tablet   Commonly known as:  MAG-OX   This may have changed:  how much to take   Used for:  Hypomagnesemia        Dose:  800 mg   Take 2 tablets (800 mg) by mouth 3 times daily   Quantity:  180 tablet   Refills:  3                Primary Care Provider Office Phone # Fax #    Terri Cal [phone]       [facility][note 3]   [facility] [address]        Thank you!     Thank you for choosing [facility] HEPATOLOGY  for your care. Our goal is always to provide you with excellent care. Hearing back from our patients is one way we can continue to improve our services. Please take a few " minutes to complete the written survey that you may receive in the mail after your visit with us. Thank you!             Your Updated Medication List - Protect others around you: Learn how to safely use, store and throw away your medicines at www.disposemymeds.org.          This list is accurate as of: 3/15/17  5:11 PM.  Always use your most recent med list.                   Brand Name Dispense Instructions for use    * ACCU-CHEK SMARTVIEW test strip   Generic drug:  blood glucose monitoring      by In Vitro route 4 times daily Use to test blood sugar 4 times daily or as directed.       * blood glucose monitoring test strip    ACCU-CHEK JASON PLUS    150 each    Use to test blood sugar 4 times daily or as directed.  Ok to substitute alternative if insurance prefers.       ALDACTONE PO      Take 25 mg by mouth daily       ALOE VESTA 2-N-1 CLEANSER EX          Alpha Lipoic Acid 200 MG Caps      Take 2 capsules by mouth daily       blood glucose lancing device      Device to be used with lancets.       blood glucose monitoring lancets     1 Box    Use to test blood sugar 4 times daily or as directed.       calcium polycarbophil 625 MG tablet    FIBERCON     Take 2 tablets by mouth 2 times daily       carboxymethylcellulose 1 % ophthalmic solution    CELLUVISC/REFRESH LIQUIGEL    1 Bottle    Place 1 drop into both eyes 3 times daily       ciclopirox 0.77 % cream    LOPROX    90 g    Apply topically 2 times daily To feet and toenails.       COLACE PO      Take 50 mg by mouth 2 times daily       fiber modular packet      2 times daily (with meals)       furosemide 80 MG tablet    LASIX    30 tablet    Take 1 tablet (80 mg) by mouth daily       glucose 40 % Gel gel      Take 15-30 g by mouth every 15 minutes as needed for low blood sugar       insulin glargine 100 UNIT/ML injection    LANTUS    3 mL    Inject 50 Units Subcutaneous At Bedtime       insulin lispro 100 UNIT/ML injection    HumaLOG KWIKpen    3 mL    Dosing  "per separately attached sliding scale.       magnesium oxide 400 MG tablet    MAG-OX    180 tablet    Take 2 tablets (800 mg) by mouth 3 times daily       metoprolol 50 MG tablet    LOPRESSOR     Take 75 mg in a.m. and 50mg in pm.       multivitamins with minerals Liqd liquid     1 Bottle    15 mLs by Per Feeding Tube route daily       OXYCODONE HCL PO      Take 5 mg by mouth every 6 hours as needed (1/2 pill)       pen needles 5/16\" 31G X 8 MM Misc          PROTONIX 40 MG EC tablet   Generic drug:  pantoprazole      Take 40 mg by mouth daily       sennosides 8.6 MG tablet    SENOKOT     Take 1 tablet by mouth daily       tenofovir 300 MG tablet    VIREAD    30 tablet    Take 1 tablet (300 mg) by mouth daily       traMADol 50 MG tablet    ULTRAM    28 tablet    Take 2 tablets (100 mg) by mouth every 8 hours as needed for moderate pain       warfarin 10 MG tablet    COUMADIN    30 tablet    Take 6 mg by mouth daily Mon, wed, fri       * Notice:  This list has 2 medication(s) that are the same as other medications prescribed for you. Read the directions carefully, and ask your doctor or other care provider to review them with you.      "

## 2017-03-17 ENCOUNTER — TELEPHONE (OUTPATIENT)
Dept: GASTROENTEROLOGY | Facility: CLINIC | Age: 72
End: 2017-03-17

## 2017-03-17 LAB
HBV DNA SERPL NAA+PROBE-ACNC: ABNORMAL [IU]/ML
HBV DNA SERPL NAA+PROBE-LOG IU: <1.3 {LOG_IU}/ML

## 2017-03-17 NOTE — TELEPHONE ENCOUNTER
Left voicemail updating carli that copies of labs and office visit [facility]'s [facility].    Message  Received: Yesterday       Gabino Berman MD Guidarelli, Jacob, LPN                     Can you let Al know that his liver tests and Mg look good please?     Mrs Lara was wondering if we could fax a copy of my [facility] to Al[facility] P[facility]?  Note is now finished so that should be fine.     Thanks     Demarco

## 2017-03-30 ENCOUNTER — TELEPHONE (OUTPATIENT)
Dept: GASTROENTEROLOGY | Facility: CLINIC | Age: 72
End: 2017-03-30

## 2017-03-30 NOTE — TELEPHONE ENCOUNTER
Writer spoke with Irma regarding sliding scale insulin/blood glucose check and NPO status with Ultrasound Abd. Pt is diabetic and has to be NPO 8 hours before Ultrasound tomorrow at 0900. Verified with Imaging center that they can provide juice and blood glucose check for patient.    Arranged with pt's nurse at the [facility] to have sliding scale insulin sent out with patient. Irma has arranged for transportation through [employer]. No further actions needed.

## 2017-03-31 ENCOUNTER — OFFICE VISIT (OUTPATIENT)
Dept: TRANSPLANT | Facility: CLINIC | Age: 72
End: 2017-03-31
Attending: INTERNAL MEDICINE
Payer: MEDICARE

## 2017-03-31 VITALS
DIASTOLIC BLOOD PRESSURE: 71 MMHG | HEART RATE: 66 BPM | RESPIRATION RATE: 18 BRPM | SYSTOLIC BLOOD PRESSURE: 119 MMHG | TEMPERATURE: 97.6 F | OXYGEN SATURATION: 95 %

## 2017-03-31 DIAGNOSIS — D46.9 MDS (MYELODYSPLASTIC SYNDROME) (H): Primary | ICD-10-CM

## 2017-03-31 PROCEDURE — 90670 PCV13 VACCINE IM: CPT | Mod: ZF | Performed by: INTERNAL MEDICINE

## 2017-03-31 PROCEDURE — 25000128 H RX IP 250 OP 636: Mod: ZF | Performed by: INTERNAL MEDICINE

## 2017-03-31 PROCEDURE — 90648 HIB PRP-T VACCINE 4 DOSE IM: CPT | Mod: ZF | Performed by: INTERNAL MEDICINE

## 2017-03-31 PROCEDURE — G0009 ADMIN PNEUMOCOCCAL VACCINE: HCPCS

## 2017-03-31 PROCEDURE — 90472 IMMUNIZATION ADMIN EACH ADD: CPT

## 2017-03-31 PROCEDURE — 90713 POLIOVIRUS IPV SC/IM: CPT | Mod: ZF | Performed by: INTERNAL MEDICINE

## 2017-03-31 PROCEDURE — 25000125 ZZHC RX 250: Mod: ZF | Performed by: INTERNAL MEDICINE

## 2017-03-31 PROCEDURE — 90715 TDAP VACCINE 7 YRS/> IM: CPT | Mod: ZF | Performed by: INTERNAL MEDICINE

## 2017-03-31 RX ADMIN — TETANUS TOXOID, REDUCED DIPHTHERIA TOXOID AND ACELLULAR PERTUSSIS VACCINE, ADSORBED 0.5 ML: 5; 2.5; 8; 8; 2.5 SUSPENSION INTRAMUSCULAR at 10:47

## 2017-03-31 RX ADMIN — PNEUMOCOCCAL 13-VALENT CONJUGATE VACCINE 0.5 ML: 2.2; 2.2; 2.2; 2.2; 2.2; 4.4; 2.2; 2.2; 2.2; 2.2; 2.2; 2.2; 2.2 INJECTION, SUSPENSION INTRAMUSCULAR at 10:47

## 2017-03-31 RX ADMIN — POLIOVIRUS TYPE 1 ANTIGEN (FORMALDEHYDE INACTIVATED), POLIOVIRUS TYPE 2 ANTIGEN (FORMALDEHYDE INACTIVATED), AND POLIOVIRUS TYPE 3 ANTIGEN (FORMALDEHYDE INACTIVATED) 0.5 ML: 40; 8; 32 INJECTION, SUSPENSION INTRAMUSCULAR at 10:46

## 2017-03-31 RX ADMIN — HAEMOPHILUS B POLYSACCHARIDE CONJUGATE VACCINE FOR INJ 0.5 ML: RECON SOLN at 10:47

## 2017-03-31 NOTE — MR AVS SNAPSHOT
After Visit Summary   3/31/2017    Garcia Lara    MRN: [id number]           Patient Information     Date Of Birth          1945        Visit Information        Provider Department      3/31/2017 9:45 AM 1,  Bmt Nurse [facility] Blood and Marrow Transplant        Today's Diagnoses     MDS (myelodysplastic syndrome) (H)    -  1          [facility] ([facility])  [address] [place] [address]  Phone: [phone]  Clinic Hours:   Monday-Friday:    8am to 4:30pm   Weekends and holidays:    8am to noon (in general)  If your fever is 100.5  or greater,   call the clinic.  After hours call the   hospital at [phone] or   [phone]. Ask for the BMT   fellow for pediatric or adult patients            Follow-ups after your visit        Your next 10 appointments already scheduled     Apr 18, 2017 11:30 AM CDT   (Arrive by 11:15 AM)   RETURN DIABETES with Cherry Crowder PA-C   [facility] Endocrinology ([facility])    [address] [place] [address] [phone]            Sep 13, 2017 10:30 AM CDT   Lab with  LAB   [facility] Lab ([facility])    [address] [place] [address] [phone]            Sep 13, 2017 11:30 AM CDT   (Arrive by 11:15 AM)   Return General Liver with Gabino Molina MD   [facility] Hepatology ([facility])    [address] [place] [address] [phone]              Who to contact     If you have questions or need follow up information about today's clinic visit or your schedule please contact [facility] BLOOD AND MARROW TRANSPLANT directly at [phone].  Normal or non-critical lab and imaging results will be communicated to you by MyChart, letter or phone within 4 business days after the clinic has received the results. If you do not hear from us within 7 days, please contact the  "clinic through [employer] or phone. If you have a critical or abnormal lab result, we will notify you by phone as soon as possible.  Submit refill requests through [employer] or call your pharmacy and they will forward the refill request to us. Please allow 3 business days for your refill to be completed.          Additional Information About Your Visit        Tr[employer] Information     [employer] lets you send messages to your doctor, view your test results, renew your prescriptions, schedule appointments and more. To sign up, go to [email or web address] . Click on \"Log in\" on the left side of the screen, which will take you to the Welcome page. Then click on \"Sign up Now\" on the right side of the page.     You will be asked to enter the access code listed below, as well as some personal information. Please follow the directions to create your username and password.     Your access code is: [email or web address]  Expires: 2017  5:11 PM     Your access code will  in 90 days. If you need help or a new code, please call your [place] clinic or [phone].        Care EveryWhere ID     This is your Care EveryWhere ID. This could be used by other organizations to access your [place] medical records  [id number]        Your Vitals Were     Pulse Temperature Respirations Pulse Oximetry          66 97.6  F (36.4  C) (Oral) 18 95%         Blood Pressure from Last 3 Encounters:   17 119/71   03/15/17 117/71   17 118/66    Weight from Last 3 Encounters:   16 112.5 kg (248 lb)   16 109.8 kg (242 lb)   16 109.7 kg (241 lb 13.5 oz)              Today, you had the following     No orders found for display         Today's Medication Changes          These changes are accurate as of: 3/31/17 10:52 AM.  If you have any questions, ask your nurse or doctor.               These medicines have changed or have updated prescriptions.        Dose/Directions    magnesium oxide 400 MG tablet   Commonly known as:  " MAG-OX   This may have changed:  how much to take   Used for:  Hypomagnesemia        Dose:  800 mg   Take 2 tablets (800 mg) by mouth 3 times daily   Quantity:  180 tablet   Refills:  3                Recent Review Flowsheet Data     BMT Recent Results Latest Ref Rng & Units 11/9/2016 11/9/2016 11/15/2016 11/15/2016 11/15/2016 12/12/2016 3/15/2017    WBC 4.0 - 11.0 10e9/L - 7.0 - - - 10.1 7.4    Hemoglobin 13.3 - 17.7 g/dL - 13.5 - - - 13.2(L) 13.9    Platelet Count 150 - 450 10e9/L - 188 - - - 221 192    Neutrophils (Absolute) 1.6 - 8.3 10e9/L - 3.3 - - - - -    INR 0.86 - 1.14 - 1.98(H) 1.52(H) - - 1.30(H) 2.43(H)    Sodium 133 - 144 mmol/L - 138 - - - 137 138    Potassium 3.4 - 5.3 mmol/L - 3.7 - - - 3.5 3.9    Chloride 94 - 109 mmol/L - 100 - - - 99 101    Glucose 70 - 99 mg/dL 129(H) 127(H) - 202(H) 165(H) 115(H) 313(H)    Urea Nitrogen 7 - 30 mg/dL - 12 - - - 12 14    Creatinine 0.66 - 1.25 mg/dL - 0.76 - - - 0.90 0.76    Calcium (Total) 8.5 - 10.1 mg/dL - 8.6 - - - 9.2 8.6    Protein (Total) 6.8 - 8.8 g/dL - 6.7(L) - - - 7.1 6.9    Albumin 3.4 - 5.0 g/dL - 2.5(L) - - - 2.5(L) 3.0(L)    Bilirubin (Direct) 0.0 - 0.2 mg/dL - - - - - - 0.2    Alkaline Phosphatase 40 - 150 U/L - 274(H) - - - 103 114    AST 0 - 45 U/L - 627(HH) - - - 24 18    ALT 0 - 70 U/L - 811(HH) - - - 31 23    MCV 78 - 100 fl - 102(H) - - - 103(H) 100               Primary Care Provider    None Specified       No primary provider on file.        Thank you!     Thank you for choosing [facility] BLOOD AND MARROW TRANSPLANT  for your care. Our goal is always to provide you with excellent care. Hearing back from our patients is one way we can continue to improve our services. Please take a few minutes to complete the written survey that you may receive in the mail after your visit with us. Thank you!             Your Updated Medication List - Protect others around you: Learn how to safely use, store and throw away your medicines at  www.disposemymeds.org.          This list is accurate as of: 3/31/17 10:52 AM.  Always use your most recent med list.                   Brand Name Dispense Instructions for use    * ACCU-CHEK SMARTVIEW test strip   Generic drug:  blood glucose monitoring      by In Vitro route 4 times daily Use to test blood sugar 4 times daily or as directed.       * blood glucose monitoring test strip    ACCU-CHEK JASON PLUS    150 each    Use to test blood sugar 4 times daily or as directed.  Ok to substitute alternative if insurance prefers.       ALDACTONE PO      Take 25 mg by mouth daily       ALOE VESTA 2-N-1 CLEANSER EX          Alpha Lipoic Acid 200 MG Caps      Take 2 capsules by mouth daily       blood glucose lancing device      Device to be used with lancets.       blood glucose monitoring lancets     1 Box    Use to test blood sugar 4 times daily or as directed.       calcium polycarbophil 625 MG tablet    FIBERCON     Take 2 tablets by mouth 2 times daily       carboxymethylcellulose 1 % ophthalmic solution    CELLUVISC/REFRESH LIQUIGEL    1 Bottle    Place 1 drop into both eyes 3 times daily       ciclopirox 0.77 % cream    LOPROX    90 g    Apply topically 2 times daily To feet and toenails.       COLACE PO      Take 50 mg by mouth 2 times daily       fiber modular packet      2 times daily (with meals)       furosemide 80 MG tablet    LASIX    30 tablet    Take 1 tablet (80 mg) by mouth daily       glucose 40 % Gel gel      Take 15-30 g by mouth every 15 minutes as needed for low blood sugar       insulin glargine 100 UNIT/ML injection    LANTUS    3 mL    Inject 50 Units Subcutaneous At Bedtime       insulin lispro 100 UNIT/ML injection    HumaLOG KWIKpen    3 mL    Dosing per separately attached sliding scale.       magnesium oxide 400 MG tablet    MAG-OX    180 tablet    Take 2 tablets (800 mg) by mouth 3 times daily       metoprolol 50 MG tablet    LOPRESSOR     Take 75 mg in a.m. and 50mg in pm.        "multivitamins with minerals Liqd liquid     1 Bottle    15 mLs by Per Feeding Tube route daily       OXYCODONE HCL PO      Take 5 mg by mouth every 6 hours as needed (1/2 pill)       pen needles 5/16\" 31G X 8 MM Misc          PROTONIX 40 MG EC tablet   Generic drug:  pantoprazole      Take 40 mg by mouth daily       sennosides 8.6 MG tablet    SENOKOT     Take 1 tablet by mouth daily       tenofovir 300 MG tablet    VIREAD    30 tablet    Take 1 tablet (300 mg) by mouth daily       traMADol 50 MG tablet    ULTRAM    28 tablet    Take 2 tablets (100 mg) by mouth every 8 hours as needed for moderate pain       warfarin 10 MG tablet    COUMADIN    30 tablet    Take 6 mg by mouth daily Mon, wed, fri       * Notice:  This list has 2 medication(s) that are the same as other medications prescribed for you. Read the directions carefully, and ask your doctor or other care provider to review them with you.      "

## 2017-03-31 NOTE — NURSING NOTE
Chief Complaint   Patient presents with     RECHECK     Post BMT for MDS here for Vaccinations       Philly Wills,DEWAYNE March 31, 2017 10:42 AM  Vitals done see flow sheets.

## 2017-06-26 ENCOUNTER — TELEPHONE (OUTPATIENT)
Dept: TRANSPLANT | Facility: CLINIC | Age: 72
End: 2017-06-26

## 2017-06-26 NOTE — TELEPHONE ENCOUNTER
GALEN received a call from the pt's spouse Irma regarding questions she has about financial support. Irma shared that she has had a long road with the pt, but has take the pt home to care for him after having him in a SNF for a period of time. Irma discussed that she is [facility] some [facility] the care at [facility], but wanted to know if the pt qualified for any other grants through the BMT program. Irma mentioned [facility], but we discussed at this time the zaynab is closed and not taking any new applications. Discussed with Irma that I would look through the pt's chart and see what grants the pt has already used and if they qualify for any other grants and would follow-up with her tomorrow. Irma agreed with this plan.    YOLANDA Pinto, [facility]  Phone [phone]  Pager [phone]

## 2017-06-28 ENCOUNTER — TELEPHONE (OUTPATIENT)
Dept: TRANSPLANT | Facility: CLINIC | Age: 72
End: 2017-06-28

## 2017-06-28 NOTE — TELEPHONE ENCOUNTER
Call was placed back to Irma regarding her question on if the pt qualified for any other grants. Discussed with Irma that at this time the pt does not qualify for any grants at this time ([facility]) discussed that the only survivorship zaynab that is available the pt would need cGVHD, which it does not appear the pt has. Irma does confirm that he does not have cGVHD. Joely expressed understanding of this and requested that SW call if we can think of any other grants in the future.    Nova Jimenez, YOLANDA, SW  Phone [phone]  Pager [phone]

## 2017-08-07 NOTE — NURSING NOTE
"Oncology Rooming Note    2017 1:29 PM   Garcia Lara is a 71 year old male who presents for:    Chief Complaint   Patient presents with     Bone Marrow Biopsy     here for BMBX procedure HX: MDS 2yrs s/p BMT     Initial Vitals: /55  Pulse 63  Temp 98.3  F (36.8  C)  Resp 14  SpO2 99% Estimated body mass index is 34.59 kg/(m^2) as calculated from the following:    Height as of 16: 1.803 m (5' 11\").    Weight as of 16: 112.5 kg (248 lb). There is no height or weight on file to calculate BSA.  No Pain (0) Comment: Data Unavailable   No LMP for male patient.  Allergies reviewed: Yes  Medications reviewed: No    Medications: Medication refills not needed today.  Pharmacy name entered into [facility]: [facility] - [place], [place] - [address]    Clinical concerns: Pt unable to stand for weight at this visit.  Pt and pt's wife unable to do an accurate medication review.  Name of [facility] nurse provided to BMT RACHEL if any medication questions.  PT has not taken Coumadin since last 17.  PT denies current pain.  Pt with baseline mental status deficits.  Wife at bedside, answering questions for patient.  Pt is awake and is able to state  today. PAYAL Beasley was notified.    0 minutes for nursing intake (face to face time)     Marlene Peoples RN            Pt unable to stand.  RN assisted with Stephen Lift to transfer patient to hospital bed, in 5D Apheresis Unit.  Call light in pt's lap, wife at bedside.  "

## 2017-08-07 NOTE — MR AVS SNAPSHOT
After Visit Summary   8/7/2017    Garcia Lara    MRN: [id number]           Patient Information     Date Of Birth          1945        Visit Information        Provider Department      8/7/2017 1:00 PM [facility] BONE MARROW BIOPSY;  BMT [facility]AC[facility] #3 [facility] Blood and Marrow Transplant        Today's Diagnoses     Anticoagulation goal of INR 1.5 to 2.5    -  1    Chronic viral hepatitis B without delta agent and without coma (H)        MDS (myelodysplastic syndrome) (H)              [facility] ([facility])  [address] [place] [id number]  Phone: [phone]  Clinic Hours:   Monday-Thursday:7am to 7pm   Friday: 7am to 5pm   Weekends and holidays:    8am to noon (in general)  If your fever is 100.5  or greater,   call the clinic.  After hours call the   hospital at [phone] or   [phone]. Ask for the BMT   fellow on-call           Care Instructions    - Return as scheduled on 8/10          Follow-ups after your visit        Your next 10 appointments already scheduled     Aug 10, 2017  9:30 AM CDT   BMT Anniversary Visit with Meri Aly MD   [facility] Blood and Marrow Transplant ([facility])    [address] [place] [address]  2nd [facility] [id number] [phone]            Sep 13, 2017 10:30 AM CDT   Lab with  LAB   [facility] Lab ([facility])    [address] [place] [address]  1st [facility] [id number] [phone]            Sep 13, 2017 11:20 AM CDT   (Arrive by 11:05 AM)   Return General Liver with Gabino Molina MD   [facility] Hepatology ([facility])    [address] [place] [address]  3rd [facility] [id number] [phone]              Who to contact     If you have questions or need follow up information about today's clinic visit or your schedule please contact [facility] BLOOD AND MARROW TRANSPLANT directly at [phone].  Normal or non-critical lab and imaging results will  "be communicated to you by MyChart, letter or phone within 4 business days after the clinic has received the results. If you do not hear from us within 7 days, please contact the clinic through LOFTY or phone. If you have a critical or abnormal lab result, we will notify you by phone as soon as possible.  Submit refill requests through LOFTY or call your pharmacy and they will forward the refill request to us. Please allow 3 business days for your refill to be completed.          Additional Information About Your Visit        LOFTY Information     LOFTY lets you send messages to your doctor, view your test results, renew your prescriptions, schedule appointments and more. To sign up, go to [email or web address] . Click on \"Log in\" on the left side of the screen, which will take you to the Welcome page. Then click on \"Sign up Now\" on the right side of the page.     You will be asked to enter the access code listed below, as well as some personal information. Please follow the directions to create your username and password.     Your access code is: [email or web address]  Expires: 10/22/2017  6:30 AM     Your access code will  in 90 days. If you need help or a new code, please call your [place] clinic or [phone].        Care EveryWhere ID     This is your Care EveryWhere ID. This could be used by other organizations to access your [facility] medical records  [id number]        Your Vitals Were     Pulse Temperature Respirations Pulse Oximetry          63 98.3  F (36.8  C) 14 99%         Blood Pressure from Last 3 Encounters:   17 100/55   17 119/71   03/15/17 117/71    Weight from Last 3 Encounters:   16 112.5 kg (248 lb)   16 109.8 kg (242 lb)   16 109.7 kg (241 lb 13.5 oz)              We Performed the Following     CBC with platelets     INR          Today's Medication Changes          These changes are accurate as of: 17  2:25 PM.  If you have any questions, ask your " nurse or doctor.               These medicines have changed or have updated prescriptions.        Dose/Directions    magnesium oxide 400 MG tablet   Commonly known as:  MAG-OX   This may have changed:  how much to take   Used for:  Hypomagnesemia        Dose:  800 mg   Take 2 tablets (800 mg) by mouth 3 times daily   Quantity:  180 tablet   Refills:  3                Recent Review Flowsheet Data     BMT Recent Results Latest Ref Rng & Units 11/9/2016 11/15/2016 11/15/2016 11/15/2016 12/12/2016 3/15/2017 8/7/2017    WBC 4.0 - 11.0 10e9/L 7.0 - - - 10.1 7.4 6.9    Hemoglobin 13.3 - 17.7 g/dL 13.5 - - - 13.2(L) 13.9 12.5(L)    Platelet Count 150 - 450 10e9/L 188 - - - 221 192 216    Platelets 150 - 450 10:9/L - - - - - - -    Neutrophils (Absolute) 1.6 - 8.3 10e9/L 3.3 - - - - - -    Blasts (Absolute) 0 10e9/L - - - - - - -    INR 0.86 - 1.14 1.98(H) 1.52(H) - - 1.30(H) 2.43(H) 1.19(H)    Sodium 133 - 144 mmol/L 138 - - - 137 138 -    Potassium 3.4 - 5.3 mmol/L 3.7 - - - 3.5 3.9 -    Chloride 94 - 109 mmol/L 100 - - - 99 101 -    Glucose 70 - 99 mg/dL 127(H) - 202(H) 165(H) 115(H) 313(H) -    Urea Nitrogen 7 - 30 mg/dL 12 - - - 12 14 -    Creatinine 0.66 - 1.25 mg/dL 0.76 - - - 0.90 0.76 -    Calcium (Total) 8.5 - 10.1 mg/dL 8.6 - - - 9.2 8.6 -    Protein (Total) 6.8 - 8.8 g/dL 6.7(L) - - - 7.1 6.9 -    Albumin 3.4 - 5.0 g/dL 2.5(L) - - - 2.5(L) 3.0(L) -    Bilirubin (Direct) 0.0 - 0.2 mg/dL - - - - - 0.2 -    Alkaline Phosphatase 40 - 150 U/L 274(H) - - - 103 114 -    AST 0 - 45 U/L 627(HH) - - - 24 18 -    ALT 0 - 70 U/L 811(HH) - - - 31 23 -    MCV 78 - 100 fl 102(H) - - - 103(H) 100 101(H)               Primary Care Provider    None Specified       No primary provider on file.        Equal Access to Services     [facility]: Romeo Lucero, bryan kohler, tiara elder, vianney león. [facility] [phone].    ATENCIÓN: nicole Flores walker  disposición servicios gratuitos de asistencia lingüística. Rajani cardenas [phone].    We comply with applicable federal civil rights laws and [place] laws. We do not discriminate on the basis of race, color, national origin, age, disability sex, sexual orientation or gender identity.            Thank you!     Thank you for choosing [facility] BLOOD AND MARROW TRANSPLANT  for your care. Our goal is always to provide you with excellent care. Hearing back from our patients is one way we can continue to improve our services. Please take a few minutes to complete the written survey that you may receive in the mail after your visit with us. Thank you!             Your Updated Medication List - Protect others around you: Learn how to safely use, store and throw away your medicines at www.disposemymeds.org.          This list is accurate as of: 8/7/17  2:25 PM.  Always use your most recent med list.                   Brand Name Dispense Instructions for use Diagnosis    * ACCU-CHEK SMARTVIEW test strip   Generic drug:  blood glucose monitoring      by In Vitro route 4 times daily Use to test blood sugar 4 times daily or as directed.    Encounter for long-term (current) use of other medications, MDS (myelodysplastic syndrome) (H), Personal history of diseases of blood and blood-forming organs, Anemia of other chronic disease, Low grade myelodysplastic syndrome lesions (H), Myelodysplastic syndrome, unspecified (H)       * blood glucose monitoring test strip    ACCU-CHEK JASON PLUS    150 each    Use to test blood sugar 4 times daily or as directed.  Ok to substitute alternative if insurance prefers.    Diabetes mellitus, type 2 (H)       ALDACTONE PO      Take 25 mg by mouth daily        ALOE VESTA 2-N-1 CLEANSER EX           Alpha Lipoic Acid 200 MG Caps      Take 2 capsules by mouth daily    Chronic viral hepatitis B without delta agent and without coma (H)       blood glucose lancing device      Device to be used with  lancets.    Encounter for long-term (current) use of other medications, MDS (myelodysplastic syndrome) (H), Personal history of diseases of blood and blood-forming organs, Anemia of other chronic disease, Low grade myelodysplastic syndrome lesions (H), Myelodysplastic syndrome, unspecified (H)       blood glucose monitoring lancets     1 Box    Use to test blood sugar 4 times daily or as directed.    Diabetes mellitus, type 2 (H)       calcium polycarbophil 625 MG tablet    FIBERCON     Take 2 tablets by mouth 2 times daily        carboxymethylcellulose 1 % ophthalmic solution    CELLUVISC/REFRESH LIQUIGEL    1 Bottle    Place 1 drop into both eyes 3 times daily    Dry eyes, bilateral       ciclopirox 0.77 % cream    LOPROX    90 g    Apply topically 2 times daily To feet and toenails.    Tinea pedis of both feet       COLACE PO      Take 50 mg by mouth 2 times daily        fiber modular packet      2 times daily (with meals)        furosemide 80 MG tablet    LASIX    30 tablet    Take 1 tablet (80 mg) by mouth daily    Generalized edema       glucose 40 % Gel gel      Take 15-30 g by mouth every 15 minutes as needed for low blood sugar    MDS (myelodysplastic syndrome) (H), Metabolic encephalopathy       insulin glargine 100 UNIT/ML injection    LANTUS    3 mL    Inject 50 Units Subcutaneous At Bedtime    Type 2 diabetes mellitus with diabetic neuropathy (H)       insulin lispro 100 UNIT/ML injection    HumaLOG KWIKpen    3 mL    Dosing per separately attached sliding scale.    Type 2 diabetes mellitus with diabetic neuropathy (H)       magnesium oxide 400 MG tablet    MAG-OX    180 tablet    Take 2 tablets (800 mg) by mouth 3 times daily    Hypomagnesemia       metoprolol 50 MG tablet    LOPRESSOR     Take 75 mg in a.m. and 50mg in pm.    Chronic viral hepatitis B without delta agent and without coma (H)       multivitamins with minerals Liqd liquid     1 Bottle    15 mLs by Per Feeding Tube route daily     "Nutritional deficiency       OXYCODONE HCL PO      Take 5 mg by mouth every 6 hours as needed (1/2 pill)        pen needles 5/16\" 31G X 8 MM Misc       Encounter for long-term (current) use of other medications, MDS (myelodysplastic syndrome) (H), Personal history of diseases of blood and blood-forming organs, Anemia of other chronic disease, Low grade myelodysplastic syndrome lesions (H), Myelodysplastic syndrome, unspecified (H)       PROTONIX 40 MG EC tablet   Generic drug:  pantoprazole      Take 40 mg by mouth daily    Chronic viral hepatitis B without delta agent and without coma (H)       sennosides 8.6 MG tablet    SENOKOT     Take 1 tablet by mouth daily        tenofovir 300 MG tablet    VIREAD    30 tablet    Take 1 tablet (300 mg) by mouth daily    Hepatitis B virus infection, unspecified chronicity       traMADol 50 MG tablet    ULTRAM    28 tablet    Take 2 tablets (100 mg) by mouth every 8 hours as needed for moderate pain    Ulcer of toe, right, with fat layer exposed (H), Diabetic neuropathy with neurologic complication (H), Diabetes mellitus with peripheral vascular disease (H)       warfarin 10 MG tablet    COUMADIN    30 tablet    Take 6 mg by mouth daily Mon, wed, fri    Elevated LFTs       * Notice:  This list has 2 medication(s) that are the same as other medications prescribed for you. Read the directions carefully, and ask your doctor or other care provider to review them with you.      "

## 2017-08-07 NOTE — PROGRESS NOTES
BMT Teaching Flowsheet    Garcia Lara is a 71 year old male  The primary encounter diagnosis was Anticoagulation goal of INR 1.5 to 2.5. Diagnoses of Chronic viral hepatitis B without delta agent and without coma (H) and MDS (myelodysplastic syndrome) (H) were also pertinent to this visit.    Teaching Topic: bone marrow biopsy    Person(s) involved in teaching: Patient  Motivation Level  Asks Questions: Yes  Eager to Learn: Yes  Cooperative: Yes  Receptive (willing/able to accept information): Yes  Any cultural factors/[place] beliefs that may influence understanding or compliance? No    Patient demonstrates understanding of the following:  - Reason for the appointment, diagnosis and treatment plan: Yes  - Knowledge of proper use of medications and conditions for which they are ordered (with special attention to potential side effects or drug interactions): Yes  - Which situations necessitate calling provider and whom to contact: Yes    Teaching concerns addressed: Pain management, site care, activity level    Time spent with patient: 60 minutes.    Specific Concerns: No, explain: Patient dressing is clean dy and intact.  Patient has no complaints of pain post procedure.

## 2017-08-07 NOTE — PROGRESS NOTES
BMT ONC Adult Bone Marrow Biopsy Procedure Note  August 7, 2017  /55  Pulse 63  Temp 98.3  F (36.8  C)  Resp 14  SpO2 99%     Learning needs assessment complete within 12 months? YES    DIAGNOSIS: History of MDS, currently 2 years post transplant      PROCEDURE: Unilateral Bone Marrow Biopsy    [place] [facility]  INR: Normalized at 1.1. [facility] War[facility] on Tuesday. He has been holding anticoagulation since 8/2.  Lab Results   Component Value Date    INR 1.19 08/07/2017    INR 2.43 03/15/2017    INR 1.30 12/12/2016    INR 1.52 11/15/2016    INR 1.98 11/09/2016    INR 1.9 11/09/2016    INR 4.33 11/02/2016    INR 3.19 10/27/2016    INR 2.21 10/20/2016    INR 2.03 10/13/2016    INR 1.79 10/06/2016    INR 1.46 09/28/2016    INR 1.31 09/20/2016       Patient s identification was positively verified by verbal identification and invasive procedure safety checklist was completed. Informed consent was obtained. Patient was placed in the prone position and prepped and draped in a sterile manner. Approximately 15 cc of 1% Lidocaine was used over the right posterior iliac spine. Following this a 3 mm incision was made. Trephine bone marrow core(s) was (were) obtained from the [facility]. Bone marrow aspirates were obtained from the [facility]. Aspirates were sent for morphology, immunophenotyping, cytogenetics and molecular diagnostics RFLP. A total of approximately 20 ml of marrow was aspirated. Following this procedure a sterile dressing was applied to the bone marrow biopsy site(s). The patient was placed in the supine position to maintain pressure on the biopsy site. Post-procedure wound care instructions were given.     Complications: NO    Pre-procedural pain: 0 out of 10 on the numeric pain rating scale.     Procedural pain: Unable to state pain scale score due to chronically altered mental status, he did grunt and pull away during the initial core but when asked if  "comfortable shortly after he said \"yes\".     Post-procedural pain assessment: Said he was comfortable at the end of the procdure. Did not appear in pain per faces scale.     Interventions: NO    Length of procedure:20 minutes or less      Procedure performed by: Bertha Paniagua, MSN, APRN, ACNP-BC  Pager: [phone]      "

## 2017-08-10 NOTE — NURSING NOTE
"Oncology Rooming Note    August 10, 2017 9:04 AM   Garcia Lara is a 71 year old male who presents for:    No chief complaint on file.    Initial Vitals: BP 97/60  Pulse 69  Temp 98.8  F (37.1  C)  Resp 16  SpO2 97% Estimated body mass index is 34.59 kg/(m^2) as calculated from the following:    Height as of 12/12/16: 1.803 m (5' 11\").    Weight as of 12/12/16: 112.5 kg (248 lb). There is no height or weight on file to calculate BSA.  No Pain (0) Comment: Data Unavailable   No LMP for male patient.  Allergies reviewed: Yes  Medications reviewed: Yes    Medications: Medication refills not needed today.  Pharmacy name entered into [facility]: [facility] - [place], [place] - [address]    6 minutes for nursing intake (face to face time)     Elda Casillas RN              "

## 2017-08-10 NOTE — MR AVS SNAPSHOT
After Visit Summary   8/10/2017    Garcia Lara    MRN: [id number]           Patient Information     Date Of Birth          1945        Visit Information        Provider Department      8/10/2017 9:30 AM Meri Aly MD [facility] Blood and Marrow Transplant        Today's Diagnoses     Chronic myelomonocytic leukemia, in relapse (H)    -  1          [facility] ([facility])  [address] [place] [id number]  Phone: [phone]  Clinic Hours:   Monday-Thursday:7am to 7pm   Friday: 7am to 5pm   Weekends and holidays:    8am to noon (in general)  If your fever is 100.5  or greater,   call the clinic.  After hours call the   hospital at [phone] or   [phone]. Ask for the BMT   fellow on-call            Follow-ups after your visit        Your next 10 appointments already scheduled     Sep 13, 2017 10:30 AM CDT   Lab with  LAB   [facility] Lab ([facility])    [address] [place] [id number] [phone]            Sep 13, 2017 11:20 AM CDT   (Arrive by 11:05 AM)   Return General Liver with Gabino Molina MD   [facility] Hepatology ([facility])    [address] [place] [id number] [phone]              Who to contact     If you have questions or need follow up information about today's clinic visit or your schedule please contact [facility] BLOOD AND MARROW TRANSPLANT directly at [phone].  Normal or non-critical lab and imaging results will be communicated to you by MyChart, letter or phone within 4 business days after the clinic has received the results. If you do not hear from us within 7 days, please contact the clinic through MyChart or phone. If you have a critical or abnormal lab result, we will notify you by phone as soon as possible.  Submit refill requests through [employer] or call your pharmacy and they will forward the refill request to us.  "Please allow 3 business days for your refill to be completed.          Additional Information About Your Visit        EyeIChart Information     EyeIChart lets you send messages to your doctor, view your test results, renew your prescriptions, schedule appointments and more. To sign up, go to [email or web address] . Click on \"Log in\" on the left side of the screen, which will take you to the Welcome page. Then click on \"Sign up Now\" on the right side of the page.     You will be asked to enter the access code listed below, as well as some personal information. Please follow the directions to create your username and password.     Your access code is: [email or web address]  Expires: 10/22/2017  6:30 AM     Your access code will  in 90 days. If you need help or a new code, please call your [facility] clinic or [phone].        Care EveryWhere ID     This is your Care EveryWhere ID. This could be used by other organizations to access your [facility] medical records  [id number]        Your Vitals Were     Pulse Temperature Respirations Pulse Oximetry          69 98.8  F (37.1  C) 16 97%         Blood Pressure from Last 3 Encounters:   08/10/17 97/60   17 100/55   17 119/71    Weight from Last 3 Encounters:   16 112.5 kg (248 lb)   16 109.8 kg (242 lb)   16 109.7 kg (241 lb 13.5 oz)              We Performed the Following     Chromosome bone marrow          Today's Medication Changes          These changes are accurate as of: 8/10/17 12:24 PM.  If you have any questions, ask your nurse or doctor.               These medicines have changed or have updated prescriptions.        Dose/Directions    magnesium oxide 400 MG tablet   Commonly known as:  MAG-OX   This may have changed:  how much to take   Used for:  Hypomagnesemia        Dose:  800 mg   Take 2 tablets (800 mg) by mouth 3 times daily   Quantity:  180 tablet   Refills:  3                Recent Review Flowsheet Data     BMT Recent Results " Latest Ref Rng & Units 11/9/2016 11/15/2016 11/15/2016 11/15/2016 12/12/2016 3/15/2017 8/7/2017    WBC 4.0 - 11.0 10e9/L 7.0 - - - 10.1 7.4 6.9    Hemoglobin 13.3 - 17.7 g/dL 13.5 - - - 13.2(L) 13.9 12.5(L)    Platelet Count 150 - 450 10e9/L 188 - - - 221 192 216    Platelets 150 - 450 10:9/L - - - - - - -    Neutrophils (Absolute) 1.6 - 8.3 10e9/L 3.3 - - - - - -    Blasts (Absolute) 0 10e9/L - - - - - - -    INR 0.86 - 1.14 1.98(H) 1.52(H) - - 1.30(H) 2.43(H) 1.19(H)    Sodium 133 - 144 mmol/L 138 - - - 137 138 -    Potassium 3.4 - 5.3 mmol/L 3.7 - - - 3.5 3.9 -    Chloride 94 - 109 mmol/L 100 - - - 99 101 -    Glucose 70 - 99 mg/dL 127(H) - 202(H) 165(H) 115(H) 313(H) -    Urea Nitrogen 7 - 30 mg/dL 12 - - - 12 14 -    Creatinine 0.66 - 1.25 mg/dL 0.76 - - - 0.90 0.76 -    Calcium (Total) 8.5 - 10.1 mg/dL 8.6 - - - 9.2 8.6 -    Protein (Total) 6.8 - 8.8 g/dL 6.7(L) - - - 7.1 6.9 -    Albumin 3.4 - 5.0 g/dL 2.5(L) - - - 2.5(L) 3.0(L) -    Bilirubin (Direct) 0.0 - 0.2 mg/dL - - - - - 0.2 -    Alkaline Phosphatase 40 - 150 U/L 274(H) - - - 103 114 -    AST 0 - 45 U/L 627(HH) - - - 24 18 -    ALT 0 - 70 U/L 811(HH) - - - 31 23 -    MCV 78 - 100 fl 102(H) - - - 103(H) 100 101(H)               Primary Care Provider    None Specified       No primary provider on file.        Equal Access to Services     GRAYSON STANTON : Romeo Lucero, bryan kohler, tiara marimakeyonna elder, vianney león. So [facility] [phone].    ATENCIÓN: Si habla español, tiene a walker disposición servicios gratuitos de asistencia lingüística. Llame al [phone].    We comply with applicable federal civil rights laws and [place] laws. We do not discriminate on the basis of race, color, national origin, age, disability sex, sexual orientation or gender identity.            Thank you!     Thank you for choosing [facility] BLOOD AND MARROW TRANSPLANT  for your care. Our goal is always to provide you with  excellent care. Hearing back from our patients is one way we can continue to improve our services. Please take a few minutes to complete the written survey that you may receive in the mail after your visit with us. Thank you!             Your Updated Medication List - Protect others around you: Learn how to safely use, store and throw away your medicines at www.disposemymeds.org.          This list is accurate as of: 8/10/17 12:24 PM.  Always use your most recent med list.                   Brand Name Dispense Instructions for use Diagnosis    * ACCU-CHEK SMARTVIEW test strip   Generic drug:  blood glucose monitoring      by In Vitro route 4 times daily Use to test blood sugar 4 times daily or as directed.    Encounter for long-term (current) use of other medications, MDS (myelodysplastic syndrome) (H), Personal history of diseases of blood and blood-forming organs, Anemia of other chronic disease, Low grade myelodysplastic syndrome lesions (H), Myelodysplastic syndrome, unspecified (H)       * blood glucose monitoring test strip    ACCU-CHEK JASON PLUS    150 each    Use to test blood sugar 4 times daily or as directed.  Ok to substitute alternative if insurance prefers.    Diabetes mellitus, type 2 (H)       ALDACTONE PO      Take 25 mg by mouth daily        ALOE VESTA 2-N-1 CLEANSER EX           Alpha Lipoic Acid 200 MG Caps      Take 2 capsules by mouth daily    Chronic viral hepatitis B without delta agent and without coma (H)       blood glucose lancing device      Device to be used with lancets.    Encounter for long-term (current) use of other medications, MDS (myelodysplastic syndrome) (H), Personal history of diseases of blood and blood-forming organs, Anemia of other chronic disease, Low grade myelodysplastic syndrome lesions (H), Myelodysplastic syndrome, unspecified (H)       blood glucose monitoring lancets     1 Box    Use to test blood sugar 4 times daily or as directed.    Diabetes mellitus, type 2  "(H)       calcium polycarbophil 625 MG tablet    FIBERCON     Take 2 tablets by mouth 2 times daily        carboxymethylcellulose 1 % ophthalmic solution    CELLUVISC/REFRESH LIQUIGEL    1 Bottle    Place 1 drop into both eyes 3 times daily    Dry eyes, bilateral       ciclopirox 0.77 % cream    LOPROX    90 g    Apply topically 2 times daily To feet and toenails.    Tinea pedis of both feet       COLACE PO      Take 50 mg by mouth 2 times daily        fiber modular packet      2 times daily (with meals)        furosemide 80 MG tablet    LASIX    30 tablet    Take 1 tablet (80 mg) by mouth daily    Generalized edema       glucose 40 % Gel gel      Take 15-30 g by mouth every 15 minutes as needed for low blood sugar    MDS (myelodysplastic syndrome) (H), Metabolic encephalopathy       insulin glargine 100 UNIT/ML injection    LANTUS    3 mL    Inject 50 Units Subcutaneous At Bedtime    Type 2 diabetes mellitus with diabetic neuropathy (H)       insulin lispro 100 UNIT/ML injection    HumaLOG KWIKpen    3 mL    Dosing per separately attached sliding scale.    Type 2 diabetes mellitus with diabetic neuropathy (H)       magnesium oxide 400 MG tablet    MAG-OX    180 tablet    Take 2 tablets (800 mg) by mouth 3 times daily    Hypomagnesemia       metoprolol 50 MG tablet    LOPRESSOR     Take 75 mg in a.m. and 50mg in pm.    Chronic viral hepatitis B without delta agent and without coma (H)       multivitamins with minerals Liqd liquid     1 Bottle    15 mLs by Per Feeding Tube route daily    Nutritional deficiency       OXYCODONE HCL PO      Take 5 mg by mouth every 6 hours as needed (1/2 pill)        pen needles 5/16\" 31G X 8 MM Misc       Encounter for long-term (current) use of other medications, MDS (myelodysplastic syndrome) (H), Personal history of diseases of blood and blood-forming organs, Anemia of other chronic disease, Low grade myelodysplastic syndrome lesions (H), Myelodysplastic syndrome, unspecified (H)    "    PROTONIX 40 MG EC tablet   Generic drug:  pantoprazole      Take 40 mg by mouth daily    Chronic viral hepatitis B without delta agent and without coma (H)       sennosides 8.6 MG tablet    SENOKOT     Take 1 tablet by mouth daily        tenofovir 300 MG tablet    VIREAD    30 tablet    Take 1 tablet (300 mg) by mouth daily    Hepatitis B virus infection, unspecified chronicity       traMADol 50 MG tablet    ULTRAM    28 tablet    Take 2 tablets (100 mg) by mouth every 8 hours as needed for moderate pain    Ulcer of toe, right, with fat layer exposed (H), Diabetic neuropathy with neurologic complication (H), Diabetes mellitus with peripheral vascular disease (H)       warfarin 10 MG tablet    COUMADIN    30 tablet    Take 6 mg by mouth daily Mon, wed, fri    Elevated LFTs       * Notice:  This list has 2 medication(s) that are the same as other medications prescribed for you. Read the directions carefully, and ask your doctor or other care provider to review them with you.

## 2017-08-10 NOTE — NURSING NOTE
Pt was given his 2 year vaccines with no concerns.   Pt was informed on possible side effects.    Kirstin Goodwin MA

## 2017-08-10 NOTE — PROGRESS NOTES
BMT Clinic 2-year Anniversary Visit Note     CHIEF COMPLAINT:  Unfortunately, the patient still remains in a wheelchair, not able to walk, and he still has cognitive deficit requiring substantial support from family for his actual daily needs.      HISTORY OF PRESENT ILLNESS:  Mr. 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  In addition, he had bilateral DVTs and generalized edema.  He stayed in rehab for a prolonged period of time; and unfortunately, he continued having short-term and long-term memory problems.  Unfortunately, at his 1-year anniversary visit, he was found to have hepatitis B infection with LFT abnormalities requiring therapy and close followup with Hepatology team.      He is now home for a few months, and his wife is helping him greatly with his daily needs.  He reports having no active infectious issues at this time.      REVIEW OF SYSTEMS:  A 12-point review of systems was reviewed and was otherwise unremarkable.      PHYSICAL EXAMINATION:   VITAL SIGNS:  Temperature 98.8 Fahrenheit, respiratory rate is 16, pulse is 69, blood pressure 97/64, saturating 97% on room air.   GENERAL:  He is in no apparent distress, in a wheelchair and was able to or at least try to say my name.   HEAD AND NECK:  Pupils are equally round to light and accommodation.  Oropharynx is clear.   CHEST:  Clear to auscultation bilaterally, no wheezes or crackles.   CARDIAC:  Regular rate and rhythm, no murmurs, rubs or gallops.   ABDOMEN:  Positive bowel sounds, soft, nontender and nondistended.  No  palpable hepatosplenomegaly.   EXTREMITIES:  No pedal edema bilaterally.   LYMPH NODES:  Exam without focal findings.  However, he continues to have bilateral leg weakness.      LABORATORY DATA:  Summarized below.   Lab Results   Component Value Date    WBC 6.9 08/07/2017    ANEU 3.3 11/09/2016    HGB 12.5 (L) 08/07/2017    HCT 39.0 (L) 08/07/2017     08/07/2017     03/15/2017    POTASSIUM 3.9 03/15/2017    CHLORIDE 101 03/15/2017    CO2 28 03/15/2017     (H) 03/15/2017    BUN 14 03/15/2017    CR 0.76 03/15/2017    MAG 1.8 03/15/2017    INR 1.19 (H) 08/07/2017        ASSESSMENT AND PLAN:  Mr. Garcia Lara is a 71-year-old gentleman 2 years post-single extended cord blood non-myeloablative stem cell transplant for chronic myelomonocytic leukemia diagnosis with course unfortunately being complicated with mental status changes and short and long-term memory problems, as well as deconditioning.      1.  CMML:  We reviewed today year 2 anniversary visit tests with bone marrow biopsy morphologically showing no evidence of disease; however, by flow cytometry testing, there is a small population of cells that express aberrant CD56 marker on monocytes, granulocytes and blasts that is consistent with pretransplant abnormal clone questioning the possibility of minimal residual disease by flow cytometry.  I believe that this probably is the case for him to have evidence of minimal residual disease at this time given chimerism also dropping from original 100% 1 year ago to present 88% donor only in his bone marrow.  Peripheral blood also shows about 5% recipient in non- blood suggestive of evidence of disease recurrence.  I did discuss with the patient and his wife in detail today that unfortunately we are concerned that in low level his disease is back.  Given the patient's clinical condition with cognitive problem and deconditioning, no further therapy would be recommended, particularly when we  have no curative treatment options.  I would suggest at this point in time to follow up with Dr. Erwin who is his local oncologist on a monthly basis for count check to see how stable his counts remain.  If his disease happens to deteriorate rather fast, I would recommend proceeding with comfort measures and supportive care only involving hospice rather soon.  We did discuss that low-dose Vidaza can be considered as an outpatient; however, given his deconditioned state and cognitive changes, it would be futile to proceed with any therapy that is first not curative and second can further compromise his remaining quality of life.  His wife demonstrated good understanding of what we discussed, and we are going to make sure this note is being mailed to her, as well as being faxed to Dr. Erwin.      2.  Hematopoiesis.  Peripheral blood counts remain relatively stable without need for any blood product transfusions.      3.  Infectious disease.  He unfortunately was noticed to have hepatitis B infection at his 1-year anniversary visit; and therefore, he was referred to Hepatology and continuous to follow up with them.      At this point, he is due for his 2-year anniversary vaccines, which we administered today including VZV vaccine and MMR.      He did have multiple complications after his transplant that included enterococcal bacteremia in 09/2015 treated with vancomycin.  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  On 08/02/2015, he had Klebsiella pneumonia as well.      4.  Severe deconditioning.  The patient is home now and has been doing some home physical and occupational therapy before trying to get into outpatient schedule which is definitely hard, but the family is greatly helping him with his care.      5.  Gciyq-xweyut-xqmc disease.  The patient had no signs or symptoms of cczey-cpmmmt-toab disease since the time of diagnosis.      6.  Endocrinology.  For his type 2 diabetes, he  continued to follow up with Endocrinology team.      We will make no further appointments for him at BMT since this is the last visit per protocol at 2 years; and unfortunately, there is evidence of minimal residual disease by flow cytometry, as well as by chimerism testing.  As above, I recommended if his disease deteriorate rather fast to proceed with comfort care approach and hospice involvement, and I am happy to answer any questions if they come.  I can be reached [phone].       50 minute visit, greater than 90% spent on counseling and care coordination.

## 2017-08-10 NOTE — LETTER
8/10/2017    RE: Garcia Lara  [address] [place] [address]     Dear Colleague,    Thank you for referring your patient, Garcia Lara, to the [facility] [place] BLOOD AND MARROW TRANSPLANT. Please see a copy of my visit note below.    BMT Clinic 2-year Anniversary Visit Note     CHIEF COMPLAINT:  Unfortunately, the patient still remains in a wheelchair, not able to walk, and he still has cognitive deficit requiring substantial support from family for his actual daily needs.      HISTORY OF PRESENT ILLNESS:  Mr. 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  In addition, he had bilateral DVTs and generalized edema.  He stayed in rehab for a prolonged period of time; and unfortunately, he continued having short-term and long-term memory problems.  Unfortunately, at his 1-year anniversary visit, he was found to have hepatitis B infection with LFT abnormalities requiring therapy and close followup with Hepatology team.      He is now home for a few months, and his wife is helping him greatly with his daily needs.  He reports having no active infectious issues at this time.      REVIEW OF SYSTEMS:  A 12-point review of systems was reviewed and was otherwise unremarkable.      PHYSICAL EXAMINATION:   VITAL SIGNS:  Temperature 98.8 Fahrenheit, respiratory rate is 16, pulse is 69, blood pressure 97/64, saturating 97% on room air.   GENERAL:  He is in no apparent distress, in a wheelchair and was able to or at least try to say my name.   HEAD AND NECK:  Pupils are equally round to light and  accommodation.  Oropharynx is clear.   CHEST:  Clear to auscultation bilaterally, no wheezes or crackles.   CARDIAC:  Regular rate and rhythm, no murmurs, rubs or gallops.   ABDOMEN:  Positive bowel sounds, soft, nontender and nondistended.  No palpable hepatosplenomegaly.   EXTREMITIES:  No pedal edema bilaterally.   LYMPH NODES:  Exam without focal findings.  However, he continues to have bilateral leg weakness.      LABORATORY DATA:  Summarized below.   Lab Results   Component Value Date    WBC 6.9 08/07/2017    ANEU 3.3 11/09/2016    HGB 12.5 (L) 08/07/2017    HCT 39.0 (L) 08/07/2017     08/07/2017     03/15/2017    POTASSIUM 3.9 03/15/2017    CHLORIDE 101 03/15/2017    CO2 28 03/15/2017     (H) 03/15/2017    BUN 14 03/15/2017    CR 0.76 03/15/2017    MAG 1.8 03/15/2017    INR 1.19 (H) 08/07/2017        ASSESSMENT AND PLAN:  Mr. Garcia Lara is a 71-year-old gentleman 2 years post-single extended cord blood non-myeloablative stem cell transplant for chronic myelomonocytic leukemia diagnosis with course unfortunately being complicated with mental status changes and short and long-term memory problems, as well as deconditioning.      1.  CMML:  We reviewed today year 2 anniversary visit tests with bone marrow biopsy morphologically showing no evidence of disease; however, by flow cytometry testing, there is a small population of cells that express aberrant CD56 marker on monocytes, granulocytes and blasts that is consistent with pretransplant abnormal clone questioning the possibility of minimal residual disease by flow cytometry.  I believe that this probably is the case for him to have evidence of minimal residual disease at this time given primary changes as well with 88% donor only in his bone marrow.  Peripheral blood also shows about 5% recipient and no  blood suggestive of evidence of disease recurrence.  I did discuss with the patient and his wife in detail today that  unfortunately this is back.  Given the patient's clinical condition, no further therapy would be recommended, particularly when we have no curative treatment options.  I would suggest at this point in time to follow up with Dr. Erwin who is his local oncologist on a monthly basis week basis for count check to see how stable his counts remain.  If his disease happens to deteriorate rather fast, I would recommend proceeding with comfort measures and supportive care only involving hospice rather soon.  We did discuss that low-dose Vidaza can be considered as an outpatient; however, given his deconditioned state and mental status changes, it would be futile to proceed with any therapy that this first not curative and second can further compromise his remaining quality of life.  His wife demonstrated good understanding of what we discussed, and we are going to make sure this note is being mailed to her, as well as being faxed to Dr. Erwin.      2.  Hematopoiesis.  Peripheral blood counts remain relatively stable without need for any blood product transfusions.      3.  Infectious disease.  He unfortunately was noticed to have hepatitis B infection at his 1-year anniversary visit; and therefore, he was referred to Hepatology and continuous to follow up with them.      At this point, he is due for his 2-year anniversary vaccines, which we administered today including VZV vaccine and MMR.      He did have multiple complications after his transplant that included enterococcal bacteremia in 09/2015 treated with vancomycin.  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  On 08/02/2015, he had Klebsiella pneumonia as well.      4.  Severe deconditioning.  The patient is home now and has been doing some home physical and occupational therapy before trying to get into outpatient schedule which is definitely hard, but the family is greatly helping him with his care.      5.  Jxjqd-ewrrwz-mvtm disease.   The patient had no signs or symptoms of nhpjd-qnacdn-toea disease since the time of diagnosis.      6.  Endocrinology.  For his type 2 diabetes, he continued to follow up with Endocrinology team.      We will make no further appointments for him at BMT since this is the last visit per protocol at 2 years; and unfortunately, there is evidence of minimal residual disease by flow cytometry, as well as by chimerism testing.  As above, I recommended if his disease deteriorate rather fast to proceed with comfort care approach and hospice involvement, and I am happy to answer any questions if they come.  I can be reached [phone].     Again, thank you for allowing me to participate in the care of your patient.      Sincerely,    Meri Aly MD

## 2017-10-11 NOTE — PROGRESS NOTES
[facility]    Hepatology follow-up    Follow-up visit for hep B    Subjective:  71 year old male    HBV  - dx Aug 2016  - no hx CHALO, IVDU or tattoos  - eAg (-), eAb (+)  - liver bx 11/15/16- ?possible cirrhosis  - HBV DNA<20, 3/15/17  - tenofovir since 9/17/16 for HBV reactivation due to reverse seroconversion    Comes to clinic this AM for follow-up of HBV.  Last clinic visit March 2017.  Since then, patient was in a nursing home as respite for his wife due the death of her mother from colon cancer.  He also saw Dr Solorzano in August who reported that his flow cytometry showed 88% donor cells in his bone marrow (compared to 100% previously) and with some markers suggestive of disease recurrence.  Due to his deconditioning and very complicated post-transplant course, it was decided to monitor the patient clinically and not pursue re-transplant.  Patient denies new medications, ER visits or new medications.    Patient is doing OK.  His alertness and memory continue to wax and wane.  The trial of lactulose at last clinic visit succeeded in increasing stool frequency but the patient's wife noticed no improvement in his mentation.    Patient denies jaundice, lower extremity edema or abdominal distension.    Patient denies melena, hematemesis or hematochezia.    Patient denies fevers, sweats or chills.  Weight stable.    Patient is compliant with his hep B medications and has adequate ref[facility]    Patient does not drink alcohol.      Medical hx Surgical hx   Past Medical History:   Diagnosis Date     Chronic hepatitis B (H)      CMML (chronic myelomonocytic leukaemia) 2014     Diabetic peripheral neuropathy associated with type 2 diabetes mellitus (H)      H/O agent Orange exposure      H/O angina pectoris 1986     H/O deep venous thrombosis      H/O stem cell transplant (H) 8/2015     Hyperlipidemia      Hypertension      Metabolic encephalopathy 8/31/2015      Pneumonia 12/2013     Type 2 diabetes mellitus (H) 1995      Past Surgical History:   Procedure Laterality Date     COLONOSCOPY       HC PRESSURE GRADIENT MEASUREMENT, INITIAL VESSEL  1986          Medications  Prior to Admission medications    Medication Sig Start Date End Date Taking? Authorizing Provider   Incontinent Wash (ALOE VESTA 2-N-1 CLEANSER EX)    Yes Reported, Patient   OXYCODONE HCL PO Take 5 mg by mouth every 6 hours as needed (1/2 pill)   Yes Reported, Patient   traMADol (ULTRAM) 50 MG tablet Take 2 tablets (100 mg) by mouth every 8 hours as needed for moderate pain 10/17/16  Yes Omer Pickens DPM   insulin lispro (HUMALOG KWIKPEN) 100 UNIT/ML soln Dosing per separately attached sliding scale. 9/20/16  Yes Carrier, Celsa BLISS PA-C   insulin glargine (LANTUS) 100 UNIT/ML PEN Inject 50 Units Subcutaneous At Bedtime 9/20/16  Yes Carrier, Celsa BLISS PA-C   tenofovir (VIREAD) 300 MG tablet Take 1 tablet (300 mg) by mouth daily 9/19/16  Yes CarrierCelsa PA-C   warfarin (COUMADIN) **10 MG** tablet Take 6 mg by mouth daily Mon, wed, fri 9/19/16  Yes Carrier, Celsa BLISS PA-C   calcium polycarbophil (FIBERCON) 625 MG tablet Take 2 tablets by mouth 2 times daily   Yes Reported, Patient   Alpha Lipoic Acid 200 MG CAPS Take 2 capsules by mouth daily   Yes Reported, Patient   metoprolol (LOPRESSOR) 50 MG tablet Take 75 mg in a.m. and 50mg in pm.   Yes Reported, Patient   pantoprazole (PROTONIX) 40 MG enteric coated tablet Take 40 mg by mouth daily   Yes Reported, Patient   Spironolactone (ALDACTONE PO) Take 25 mg by mouth daily   Yes Reported, Patient   fiber modular (NUTRISOURCE FIBER) packet 2 times daily (with meals)   Yes Reported, Patient   Docusate Sodium (COLACE PO) Take 50 mg by mouth 2 times daily   Yes Reported, Patient   sennosides (SENOKOT) 8.6 MG tablet Take 1 tablet by mouth daily   Yes Reported, Patient   blood glucose monitoring (ACCU-CHEK JASON PLUS) test strip Use to test blood sugar 4  "times daily or as directed.  Ok to substitute alternative if insurance prefers. 6/28/16  Yes Cherry Crowder PA-C   blood glucose monitoring (SOFTCLIX) lancets Use to test blood sugar 4 times daily or as directed. 6/28/16  Yes Cherry Crowder PA-C   ciclopirox (LOPROX) 0.77 % cream Apply topically 2 times daily To feet and toenails. 5/12/16  Yes Omer Pickens DPM   magnesium oxide (MAG-OX) 400 MG tablet Take 2 tablets (800 mg) by mouth 3 times daily  Patient taking differently: Take 400 mg by mouth 3 times daily  3/11/16  Yes Bertha Paniagua APRN CNP   furosemide (LASIX) 80 MG tablet Take 1 tablet (80 mg) by mouth daily 3/11/16  Yes Bertha Paniagua APRN CNP   multivitamins with minerals (CERTAVITE) LIQD 15 mLs by Per Feeding Tube route daily 3/11/16  Yes Bertha Paniagua APRN CNP   carboxymethylcellulose (CELLUVISC/REFRESH LIQUIGEL) 1 % ophthalmic solution Place 1 drop into both eyes 3 times daily 3/11/16  Yes Bertha Paniagua APRN CNP   glucose 40 % GEL Take 15-30 g by mouth every 15 minutes as needed for low blood sugar 12/15/15  Yes Celsa Hoff PA-C   blood glucose (ACCU-CHEK SMARTVIEW) test strip by In Vitro route 4 times daily Use to test blood sugar 4 times daily or as directed.   Yes Reported, Patient   blood glucose (ACCU-CHEK FASTCLIX) lancing device Device to be used with lancets.   Yes Reported, Patient   Insulin Pen Needle (PEN NEEDLES 5/16\") 31G X 8 MM MISC    Yes Reported, Patient       Allergies  Allergies   Allergen Reactions     Vancomycin Swelling     Patient developed lip swelling ~1-2 days after vancomycin was started at [place], repeated with test dose, lips swelled       Review of systems  A 10-point review of systems was negative    Examination  /72 (BP Location: Right arm, Patient Position: Sitting, Cuff Size: Adult Large)  Pulse 98  Temp 98.4  F (36.9  C) (Oral)  Resp 18  SpO2 98%  There is no height or weight on file to " calculate BMI.    Gen- well, NAD, A+Ox3, normal color  CVS- RRR  RS- CTA  Abd- obese, soft, non-tender, no ascites or organomegaly on palpation or percussion, BS+  Extr- hands normal, no KAYLIN  Skin- no rash or jaundice  Neuro- no asterixis  Psych- normal mood    Laboratory  Lab Results   Component Value Date     10/11/2017    POTASSIUM 4.0 10/11/2017    CHLORIDE 102 10/11/2017    CO2 27 10/11/2017    BUN 16 10/11/2017    CR 0.80 10/11/2017       Lab Results   Component Value Date    BILITOTAL 1.6 10/11/2017    ALT 20 10/11/2017    AST 13 10/11/2017    ALKPHOS 89 10/11/2017       Lab Results   Component Value Date    ALBUMIN 3.1 10/11/2017    PROTTOTAL 7.1 10/11/2017        Lab Results   Component Value Date    WBC 6.2 10/11/2017    HGB 13.1 10/11/2017     10/11/2017     10/11/2017       Lab Results   Component Value Date    INR 1.68 10/11/2017     HBV DNA 10/11/2017 pending    Radiology  Nil recent    Assessment  71 year old male with 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  Liver function tests normal today on antiviral therapy.  No improvement in mental status following trial of lactulose.  It is still not clear if the patient has cirrhosis as suggested on his liver biopsy from last year.  Will continue HCC screening as if he does have cirrhosis and it will be useful to perform EGD in the future- the absence of any portal hypertensive changes would argue against cirrhosis.      The patient and his wife consent to his de-identified clinical information being used to write up a case report on HBV reactivation due to reverse seroconversion in patients with history of umbilical cord stem cell transplant.    Plan  1.  Continue   2.  Low salt diet  3.  Follow-up in 6 months    Gabino Haas MD  Hepatology  [facility]

## 2017-10-11 NOTE — MR AVS SNAPSHOT
After Visit Summary   10/11/2017    Garcia Lara    MRN: [id number]           Patient Information     Date Of Birth          1945        Visit Information        Provider Department      10/11/2017 10:40 AM Gabino Berman MD [facility] Hepatology        Today's Diagnoses     Chronic viral hepatitis B without delta agent and without coma (H)    -  1       Follow-ups after your visit        Follow-up notes from your care team     Return in about 6 months (around 4/11/2018).      Who to contact     If you have questions or need follow up information about today's clinic visit or your schedule please contact [facility] HEPATOLOGY directly at [phone].  Normal or non-critical lab and imaging results will be communicated to you by MyChart, letter or phone within 4 business days after the clinic has received the results. If you do not hear from us within 7 days, please contact the clinic through MyChart or phone. If you have a critical or abnormal lab result, we will notify you by phone as soon as possible.  Submit refill requests through [employer] or call your pharmacy and they will forward the refill request to us. Please allow 3 business days for your refill to be completed.          Additional Information About Your Visit        Care EveryWhere ID     This is your Care EveryWhere ID. This could be used by other organizations to access your [place] medical records  [id number]        Your Vitals Were     Pulse Temperature Respirations Pulse Oximetry          98 98.4  F (36.9  C) (Oral) 18 98%         Blood Pressure from Last 3 Encounters:   10/11/17 113/72   08/10/17 97/60   08/07/17 100/55    Weight from Last 3 Encounters:   12/12/16 112.5 kg (248 lb)   09/16/16 109.8 kg (242 lb)   08/29/16 109.7 kg (241 lb 13.5 oz)              Today, you had the following     No orders found for display         Today's Medication Changes          These changes are accurate as of: 10/11/17  4:31 PM.  If  you have any questions, ask your nurse or doctor.               These medicines have changed or have updated prescriptions.        Dose/Directions    magnesium oxide 400 MG tablet   Commonly known as:  MAG-OX   This may have changed:  how much to take   Used for:  Hypomagnesemia        Dose:  800 mg   Take 2 tablets (800 mg) by mouth 3 times daily   Quantity:  180 tablet   Refills:  3                Primary Care Provider    None Specified       No primary provider on file.        Equal Access to Services     [facility]: Hadii phoebe adame hadpolloo Sonikko, waaxda luqadaha, qaybta kaalmada duanesoniakeyonna, vianney lucasdrewally hirsch . So [facility] [phone].    ATENCIÓN: Si habla español, tiene a walker disposición servicios gratuitos de asistencia lingüística. Llame al [phone].    We comply with applicable federal civil rights laws and [place] laws. We do not discriminate on the basis of race, color, national origin, age, disability, sex, sexual orientation, or gender identity.            Thank you!     Thank you for choosing [facility] HEPATOLOGY  for your care. Our goal is always to provide you with excellent care. Hearing back from our patients is one way we can continue to improve our services. Please take a few minutes to complete the written survey that you may receive in the mail after your visit with us. Thank you!             Your Updated Medication List - Protect others around you: Learn how to safely use, store and throw away your medicines at www.disposemymeds.org.          This list is accurate as of: 10/11/17  4:31 PM.  Always use your most recent med list.                   Brand Name Dispense Instructions for use Diagnosis    * ACCU-CHEK SMARTVIEW test strip   Generic drug:  blood glucose monitoring      by In Vitro route 4 times daily Use to test blood sugar 4 times daily or as directed.    Encounter for long-term (current) use of other medications, MDS (myelodysplastic syndrome) (H), Personal  history of diseases of blood and blood-forming organs, Anemia of other chronic disease, Low grade myelodysplastic syndrome lesions (H), Myelodysplastic syndrome, unspecified       * blood glucose monitoring test strip    ACCU-CHEK JASON PLUS    150 each    Use to test blood sugar 4 times daily or as directed.  Ok to substitute alternative if insurance prefers.    Diabetes mellitus, type 2 (H)       ALDACTONE PO      Take 25 mg by mouth daily        ALOE VESTA 2-N-1 CLEANSER EX           Alpha Lipoic Acid 200 MG Caps      Take 2 capsules by mouth daily    Chronic viral hepatitis B without delta agent and without coma (H)       blood glucose lancing device      Device to be used with lancets.    Encounter for long-term (current) use of other medications, MDS (myelodysplastic syndrome) (H), Personal history of diseases of blood and blood-forming organs, Anemia of other chronic disease, Low grade myelodysplastic syndrome lesions (H), Myelodysplastic syndrome, unspecified       blood glucose monitoring lancets     1 Box    Use to test blood sugar 4 times daily or as directed.    Diabetes mellitus, type 2 (H)       calcium polycarbophil 625 MG tablet    FIBERCON     Take 2 tablets by mouth 2 times daily        carboxymethylcellulose 1 % ophthalmic solution    CELLUVISC/REFRESH LIQUIGEL    1 Bottle    Place 1 drop into both eyes 3 times daily    Dry eyes, bilateral       ciclopirox 0.77 % cream    LOPROX    90 g    Apply topically 2 times daily To feet and toenails.    Tinea pedis of both feet       COLACE PO      Take 50 mg by mouth 2 times daily        fiber modular packet      2 times daily (with meals)        furosemide 80 MG tablet    LASIX    30 tablet    Take 1 tablet (80 mg) by mouth daily    Generalized edema       glucose 40 % Gel gel      Take 15-30 g by mouth every 15 minutes as needed for low blood sugar    MDS (myelodysplastic syndrome) (H), Metabolic encephalopathy       insulin glargine 100 UNIT/ML  "injection    LANTUS    3 mL    Inject 50 Units Subcutaneous At Bedtime    Type 2 diabetes mellitus with diabetic neuropathy (H)       insulin lispro 100 UNIT/ML injection    HumaLOG KWIKpen    3 mL    Dosing per separately attached sliding scale.    Type 2 diabetes mellitus with diabetic neuropathy (H)       magnesium oxide 400 MG tablet    MAG-OX    180 tablet    Take 2 tablets (800 mg) by mouth 3 times daily    Hypomagnesemia       metoprolol 50 MG tablet    LOPRESSOR     Take 75 mg in a.m. and 50mg in pm.    Chronic viral hepatitis B without delta agent and without coma (H)       multivitamins with minerals Liqd liquid     1 Bottle    15 mLs by Per Feeding Tube route daily    Nutritional deficiency       OXYCODONE HCL PO      Take 5 mg by mouth every 6 hours as needed (1/2 pill)        pen needles 5/16\" 31G X 8 MM Misc       Encounter for long-term (current) use of other medications, MDS (myelodysplastic syndrome) (H), Personal history of diseases of blood and blood-forming organs, Anemia of other chronic disease, Low grade myelodysplastic syndrome lesions (H), Myelodysplastic syndrome, unspecified       PROTONIX 40 MG EC tablet   Generic drug:  pantoprazole      Take 40 mg by mouth daily    Chronic viral hepatitis B without delta agent and without coma (H)       sennosides 8.6 MG tablet    SENOKOT     Take 1 tablet by mouth daily        tenofovir 300 MG tablet    VIREAD    30 tablet    Take 1 tablet (300 mg) by mouth daily    Hepatitis B virus infection, unspecified chronicity       traMADol 50 MG tablet    ULTRAM    28 tablet    Take 2 tablets (100 mg) by mouth every 8 hours as needed for moderate pain    Ulcer of toe, right, with fat layer exposed (H), Diabetic neuropathy with neurologic complication (H), Diabetes mellitus with peripheral vascular disease (H)       warfarin 10 MG tablet    COUMADIN    30 tablet    Take 6 mg by mouth daily Mon, wed, fri    Elevated LFTs       * Notice:  This list has 2 " medication(s) that are the same as other medications prescribed for you. Read the directions carefully, and ask your doctor or other care provider to review them with you.

## 2017-10-11 NOTE — LETTER
10/11/2017      RE: Garcia Lara  [address] [place] [address]       [facility]    Hepatology follow-up    Follow-up visit for hep B    Subjective:  71 year old male    HBV  - dx Aug 2016  - no hx CHALO, IVDU or tattoos  - eAg (-), eAb (+)  - liver bx 11/15/16- ?possible cirrhosis  - HBV DNA<20, 3/15/17  - tenofovir since 9/17/16 for HBV reactivation due to reverse seroconversion    Comes to clinic this AM for follow-up of HBV.  Last clinic visit March 2017.  Since then, patient was in a nursing home as respite for his wife due the death of her mother from colon cancer.  He also saw Dr Solorzano in August who reported that his flow cytometry showed 88% donor cells in his bone marrow (compared to 100% previously) and with some markers suggestive of disease recurrence.  Due to his deconditioning and very complicated post-transplant course, it was decided to monitor the patient clinically and not pursue re-transplant.  Patient denies new medications, ER visits or new medications.    Patient is doing OK.  His alertness and memory continue to wax and wane.  The trial of lactulose at last clinic visit succeeded in increasing stool frequency but the patient's wife noticed no improvement in his mentation.    Patient denies jaundice, lower extremity edema or abdominal distension.    Patient denies melena, hematemesis or hematochezia.    Patient denies fevers, sweats or chills.  Weight stable.    Patient is compliant with his hep B medications and has adequate [facility]    Patient does not drink alcohol.      Medical hx Surgical hx   Past Medical History:   Diagnosis Date     Chronic hepatitis B (H)      CMML (chronic myelomonocytic leukaemia) 2014     Diabetic peripheral neuropathy associated with type 2 diabetes mellitus (H)      H/O agent Orange exposure      H/O angina pectoris 1986     H/O deep venous thrombosis      H/O stem cell transplant (H) 8/2015      Hyperlipidemia      Hypertension      Metabolic encephalopathy 8/31/2015     Pneumonia 12/2013     Type 2 diabetes mellitus (H) 1995      Past Surgical History:   Procedure Laterality Date     COLONOSCOPY       HC PRESSURE GRADIENT MEASUREMENT, INITIAL VESSEL  1986          Medications  Prior to Admission medications    Medication Sig Start Date End Date Taking? Authorizing Provider   Incontinent Wash (ALOE VESTA 2-N-1 CLEANSER EX)    Yes Reported, Patient   OXYCODONE HCL PO Take 5 mg by mouth every 6 hours as needed (1/2 pill)   Yes Reported, Patient   traMADol (ULTRAM) 50 MG tablet Take 2 tablets (100 mg) by mouth every 8 hours as needed for moderate pain 10/17/16  Yes Omer Pickens DPM   insulin lispro (HUMALOG KWIKPEN) 100 UNIT/ML soln Dosing per separately attached sliding scale. 9/20/16  Yes Celsa Hoff PA-C   insulin glargine (LANTUS) 100 UNIT/ML PEN Inject 50 Units Subcutaneous At Bedtime 9/20/16  Yes Celsa Hoff PA-C   tenofovir (VIREAD) 300 MG tablet Take 1 tablet (300 mg) by mouth daily 9/19/16  Yes CarrierCelsa PA-C   warfarin (COUMADIN) **10 MG** tablet Take 6 mg by mouth daily Mon, wed, fri 9/19/16  Yes CarrierCelsa PA-C   calcium polycarbophil (FIBERCON) 625 MG tablet Take 2 tablets by mouth 2 times daily   Yes Reported, Patient   Alpha Lipoic Acid 200 MG CAPS Take 2 capsules by mouth daily   Yes Reported, Patient   metoprolol (LOPRESSOR) 50 MG tablet Take 75 mg in a.m. and 50mg in pm.   Yes Reported, Patient   pantoprazole (PROTONIX) 40 MG enteric coated tablet Take 40 mg by mouth daily   Yes Reported, Patient   Spironolactone (ALDACTONE PO) Take 25 mg by mouth daily   Yes Reported, Patient   fiber modular (NUTRISOURCE FIBER) packet 2 times daily (with meals)   Yes Reported, Patient   Docusate Sodium (COLACE PO) Take 50 mg by mouth 2 times daily   Yes Reported, Patient   sennosides (SENOKOT) 8.6 MG tablet Take 1 tablet by mouth daily   Yes Reported, Patient   blood  "glucose monitoring (ACCU-CHEK JASON PLUS) test strip Use to test blood sugar 4 times daily or as directed.  Ok to substitute alternative if insurance prefers. 6/28/16  Yes Cherry Crowder PA-C   blood glucose monitoring (SOFTCLIX) lancets Use to test blood sugar 4 times daily or as directed. 6/28/16  Yes Cherry Crowder PA-C   ciclopirox (LOPROX) 0.77 % cream Apply topically 2 times daily To feet and toenails. 5/12/16  Yes Omer Pickens DPM   magnesium oxide (MAG-OX) 400 MG tablet Take 2 tablets (800 mg) by mouth 3 times daily  Patient taking differently: Take 400 mg by mouth 3 times daily  3/11/16  Yes Bertha Paniagua APRN CNP   furosemide (LASIX) 80 MG tablet Take 1 tablet (80 mg) by mouth daily 3/11/16  Yes Bertha Paniagua APRN CNP   multivitamins with minerals (CERTAVITE) LIQD 15 mLs by Per Feeding Tube route daily 3/11/16  Yes Bertha Paniagua APRN CNP   carboxymethylcellulose (CELLUVISC/REFRESH LIQUIGEL) 1 % ophthalmic solution Place 1 drop into both eyes 3 times daily 3/11/16  Yes Bertha Paniagua APRN CNP   glucose 40 % GEL Take 15-30 g by mouth every 15 minutes as needed for low blood sugar 12/15/15  Yes Celsa Hoff PA-C   blood glucose (ACCU-CHEK SMARTVIEW) test strip by In Vitro route 4 times daily Use to test blood sugar 4 times daily or as directed.   Yes Reported, Patient   blood glucose (ACCU-CHEK FASTCLIX) lancing device Device to be used with lancets.   Yes Reported, Patient   Insulin Pen Needle (PEN NEEDLES 5/16\") 31G X 8 MM MISC    Yes Reported, Patient       Allergies  Allergies   Allergen Reactions     Vancomycin Swelling     Patient developed lip swelling ~1-2 days after vancomycin was started at [facility], repeated with test dose, lips swelled       Review of systems  A 10-point review of systems was negative    Examination  /72 (BP Location: Right arm, Patient Position: Sitting, Cuff Size: Adult Large)  Pulse 98  Temp 98.4  F (36.9 "  C) (Oral)  Resp 18  SpO2 98%  There is no height or weight on file to calculate BMI.    Gen- well, NAD, A+Ox3, normal color  CVS- RRR  RS- CTA  Abd- obese, soft, non-tender, no ascites or organomegaly on palpation or percussion, BS+  Extr- hands normal, no KAYLIN  Skin- no rash or jaundice  Neuro- no asterixis  Psych- normal mood    Laboratory  Lab Results   Component Value Date     10/11/2017    POTASSIUM 4.0 10/11/2017    CHLORIDE 102 10/11/2017    CO2 27 10/11/2017    BUN 16 10/11/2017    CR 0.80 10/11/2017       Lab Results   Component Value Date    BILITOTAL 1.6 10/11/2017    ALT 20 10/11/2017    AST 13 10/11/2017    ALKPHOS 89 10/11/2017       Lab Results   Component Value Date    ALBUMIN 3.1 10/11/2017    PROTTOTAL 7.1 10/11/2017        Lab Results   Component Value Date    WBC 6.2 10/11/2017    HGB 13.1 10/11/2017     10/11/2017     10/11/2017       Lab Results   Component Value Date    INR 1.68 10/11/2017     HBV DNA 10/11/2017 pending    Radiology  Nil recent    Assessment  71 year old male with 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  Liver function tests normal today on antiviral therapy.  No improvement in mental status following trial of lactulose.  It is still not clear if the patient has cirrhosis as suggested on his liver biopsy from last year.  Will continue HCC screening as if he does have cirrhosis and it will be useful to perform EGD in the future- the absence of any portal hypertensive changes would argue against cirrhosis.      The patient and his wife consent to his de-identified clinical information being used to write up a case report on HBV reactivation due to reverse seroconversion in patients with history of umbilical cord stem cell transplant.    Plan  1.  Continue   2.  Low salt diet  3.  Follow-up in 6 months    Gabino Haas  MD  Hepatology  [facility]

## 2018-01-01 ENCOUNTER — RECORDS - HEALTHEAST (OUTPATIENT)
Dept: LAB | Facility: CLINIC | Age: 73
End: 2018-01-01

## 2018-01-01 ENCOUNTER — OFFICE VISIT - HEALTHEAST (OUTPATIENT)
Dept: GERIATRICS | Facility: CLINIC | Age: 73
End: 2018-01-01

## 2018-01-01 ENCOUNTER — AMBULATORY - HEALTHEAST (OUTPATIENT)
Dept: GERIATRICS | Facility: CLINIC | Age: 73
End: 2018-01-01

## 2018-01-01 ENCOUNTER — CARE COORDINATION (OUTPATIENT)
Dept: ONCOLOGY | Facility: CLINIC | Age: 73
End: 2018-01-01

## 2018-01-01 DIAGNOSIS — Z51.81 ANTICOAGULATION MANAGEMENT ENCOUNTER: ICD-10-CM

## 2018-01-01 DIAGNOSIS — E11.40 DIABETES MELLITUS WITH NEUROPATHY (H): ICD-10-CM

## 2018-01-01 DIAGNOSIS — B19.10 HEPATITIS B: ICD-10-CM

## 2018-01-01 DIAGNOSIS — L02.92 BOILS: ICD-10-CM

## 2018-01-01 DIAGNOSIS — L08.9 DIABETIC FOOT INFECTION (H): ICD-10-CM

## 2018-01-01 DIAGNOSIS — S90.414A: ICD-10-CM

## 2018-01-01 DIAGNOSIS — Z79.01 ANTICOAGULATION MANAGEMENT ENCOUNTER: ICD-10-CM

## 2018-01-01 DIAGNOSIS — E11.628 DIABETIC FOOT INFECTION (H): ICD-10-CM

## 2018-01-01 DIAGNOSIS — D64.9 ANEMIA, UNSPECIFIED TYPE: ICD-10-CM

## 2018-01-01 DIAGNOSIS — E11.9 DM TYPE 2 (DIABETES MELLITUS, TYPE 2) (H): ICD-10-CM

## 2018-01-01 DIAGNOSIS — C93.10 CHRONIC MYELOMONOCYTIC LEUKEMIA (H): ICD-10-CM

## 2018-01-01 DIAGNOSIS — F03.90 DEMENTIA (H): ICD-10-CM

## 2018-01-01 DIAGNOSIS — M86.9 FOOT OSTEOMYELITIS, RIGHT (H): ICD-10-CM

## 2018-01-01 DIAGNOSIS — L08.9: ICD-10-CM

## 2018-01-01 DIAGNOSIS — I96 GANGRENE OF TOE OF RIGHT FOOT (H): ICD-10-CM

## 2018-01-01 DIAGNOSIS — I10 HYPERTENSION: ICD-10-CM

## 2018-01-01 DIAGNOSIS — I10 HTN (HYPERTENSION): ICD-10-CM

## 2018-01-01 DIAGNOSIS — E78.5 HYPERLIPIDEMIA: ICD-10-CM

## 2018-01-01 DIAGNOSIS — I95.9 HYPOTENSION: ICD-10-CM

## 2018-01-01 DIAGNOSIS — R41.0 CONFUSION: ICD-10-CM

## 2018-01-01 LAB
ALBUMIN SERPL-MCNC: 2.6 G/DL (ref 3.5–5)
ALBUMIN UR-MCNC: ABNORMAL MG/DL
ALP SERPL-CCNC: 57 U/L (ref 45–120)
ALT SERPL W P-5'-P-CCNC: 11 U/L (ref 0–45)
ANION GAP SERPL CALCULATED.3IONS-SCNC: 6 MMOL/L (ref 5–18)
ANION GAP SERPL CALCULATED.3IONS-SCNC: 8 MMOL/L (ref 5–18)
APPEARANCE UR: CLEAR
AST SERPL W P-5'-P-CCNC: 17 U/L (ref 0–40)
BACTERIA #/AREA URNS HPF: ABNORMAL HPF
BASOPHILS # BLD AUTO: 0 THOU/UL (ref 0–0.2)
BASOPHILS # BLD AUTO: 0.1 THOU/UL (ref 0–0.2)
BASOPHILS NFR BLD AUTO: 0 % (ref 0–2)
BASOPHILS NFR BLD AUTO: 1 % (ref 0–2)
BASOPHILS NFR BLD AUTO: 1 % (ref 0–2)
BILIRUB SERPL-MCNC: 0.3 MG/DL (ref 0–1)
BILIRUB UR QL STRIP: NEGATIVE
BUN SERPL-MCNC: 19 MG/DL (ref 8–28)
BUN SERPL-MCNC: 35 MG/DL (ref 8–28)
C REACTIVE PROTEIN LHE: 11.3 MG/DL (ref 0–0.8)
C REACTIVE PROTEIN LHE: 14.2 MG/DL (ref 0–0.8)
CALCIUM SERPL-MCNC: 8.6 MG/DL (ref 8.5–10.5)
CALCIUM SERPL-MCNC: 8.9 MG/DL (ref 8.5–10.5)
CHLORIDE BLD-SCNC: 105 MMOL/L (ref 98–107)
CHLORIDE BLD-SCNC: 110 MMOL/L (ref 98–107)
CO2 SERPL-SCNC: 23 MMOL/L (ref 22–31)
CO2 SERPL-SCNC: 24 MMOL/L (ref 22–31)
COLOR UR AUTO: YELLOW
CREAT SERPL-MCNC: 1.32 MG/DL (ref 0.7–1.3)
CREAT SERPL-MCNC: 1.36 MG/DL (ref 0.7–1.3)
EOSINOPHIL COUNT (ABSOLUTE): 0 THOU/UL (ref 0–0.4)
EOSINOPHIL COUNT (ABSOLUTE): 0.1 THOU/UL (ref 0–0.4)
EOSINOPHIL COUNT (ABSOLUTE): 0.2 THOU/UL (ref 0–0.4)
EOSINOPHIL COUNT (ABSOLUTE): 0.2 THOU/UL (ref 0–0.4)
EOSINOPHIL COUNT (ABSOLUTE): 0.3 THOU/UL (ref 0–0.4)
EOSINOPHIL NFR BLD AUTO: 0 % (ref 0–6)
EOSINOPHIL NFR BLD AUTO: 1 % (ref 0–6)
EOSINOPHIL NFR BLD AUTO: 2 % (ref 0–6)
EOSINOPHIL NFR BLD AUTO: 2 % (ref 0–6)
ERYTHROCYTE [DISTWIDTH] IN BLOOD BY AUTOMATED COUNT: 12.2 % (ref 11–14.5)
ERYTHROCYTE [DISTWIDTH] IN BLOOD BY AUTOMATED COUNT: 12.4 % (ref 11–14.5)
ERYTHROCYTE [DISTWIDTH] IN BLOOD BY AUTOMATED COUNT: 12.5 % (ref 11–14.5)
ERYTHROCYTE [DISTWIDTH] IN BLOOD BY AUTOMATED COUNT: 12.5 % (ref 11–14.5)
ERYTHROCYTE [DISTWIDTH] IN BLOOD BY AUTOMATED COUNT: 12.8 % (ref 11–14.5)
ERYTHROCYTE [DISTWIDTH] IN BLOOD BY AUTOMATED COUNT: 12.8 % (ref 11–14.5)
ERYTHROCYTE [DISTWIDTH] IN BLOOD BY AUTOMATED COUNT: 13 % (ref 11–14.5)
ERYTHROCYTE [DISTWIDTH] IN BLOOD BY AUTOMATED COUNT: 13.2 % (ref 11–14.5)
ERYTHROCYTE [DISTWIDTH] IN BLOOD BY AUTOMATED COUNT: 13.2 % (ref 11–14.5)
ERYTHROCYTE [DISTWIDTH] IN BLOOD BY AUTOMATED COUNT: 13.4 % (ref 11–14.5)
ERYTHROCYTE [DISTWIDTH] IN BLOOD BY AUTOMATED COUNT: 13.5 % (ref 11–14.5)
ERYTHROCYTE [DISTWIDTH] IN BLOOD BY AUTOMATED COUNT: 13.6 % (ref 11–14.5)
ERYTHROCYTE [DISTWIDTH] IN BLOOD BY AUTOMATED COUNT: 13.6 % (ref 11–14.5)
ERYTHROCYTE [DISTWIDTH] IN BLOOD BY AUTOMATED COUNT: 13.7 % (ref 11–14.5)
ERYTHROCYTE [DISTWIDTH] IN BLOOD BY AUTOMATED COUNT: 13.8 % (ref 11–14.5)
ERYTHROCYTE [DISTWIDTH] IN BLOOD BY AUTOMATED COUNT: 14.1 % (ref 11–14.5)
ERYTHROCYTE [DISTWIDTH] IN BLOOD BY AUTOMATED COUNT: 14.1 % (ref 11–14.5)
ERYTHROCYTE [DISTWIDTH] IN BLOOD BY AUTOMATED COUNT: 14.2 % (ref 11–14.5)
ERYTHROCYTE [DISTWIDTH] IN BLOOD BY AUTOMATED COUNT: 14.6 % (ref 11–14.5)
ERYTHROCYTE [DISTWIDTH] IN BLOOD BY AUTOMATED COUNT: 14.8 % (ref 11–14.5)
ERYTHROCYTE [DISTWIDTH] IN BLOOD BY AUTOMATED COUNT: 14.9 % (ref 11–14.5)
ERYTHROCYTE [DISTWIDTH] IN BLOOD BY AUTOMATED COUNT: 15.1 % (ref 11–14.5)
ERYTHROCYTE [DISTWIDTH] IN BLOOD BY AUTOMATED COUNT: 15.3 % (ref 11–14.5)
ERYTHROCYTE [DISTWIDTH] IN BLOOD BY AUTOMATED COUNT: 15.4 % (ref 11–14.5)
ERYTHROCYTE [DISTWIDTH] IN BLOOD BY AUTOMATED COUNT: 15.7 % (ref 11–14.5)
ERYTHROCYTE [DISTWIDTH] IN BLOOD BY AUTOMATED COUNT: 15.9 % (ref 11–14.5)
ERYTHROCYTE [DISTWIDTH] IN BLOOD BY AUTOMATED COUNT: 18.5 % (ref 11–14.5)
ERYTHROCYTE [DISTWIDTH] IN BLOOD BY AUTOMATED COUNT: 18.6 % (ref 11–14.5)
ERYTHROCYTE [DISTWIDTH] IN BLOOD BY AUTOMATED COUNT: 18.8 % (ref 11–14.5)
ERYTHROCYTE [DISTWIDTH] IN BLOOD BY AUTOMATED COUNT: 18.9 % (ref 11–14.5)
ERYTHROCYTE [SEDIMENTATION RATE] IN BLOOD BY WESTERGREN METHOD: 102 MM/HR (ref 0–15)
ERYTHROCYTE [SEDIMENTATION RATE] IN BLOOD BY WESTERGREN METHOD: 115 MM/HR (ref 0–15)
GFR SERPL CREATININE-BSD FRML MDRD: 52 ML/MIN/1.73M2
GFR SERPL CREATININE-BSD FRML MDRD: 53 ML/MIN/1.73M2
GLUCOSE BLD-MCNC: 104 MG/DL (ref 70–125)
GLUCOSE BLD-MCNC: 128 MG/DL (ref 70–125)
GLUCOSE UR STRIP-MCNC: NEGATIVE MG/DL
HCT VFR BLD AUTO: 20.2 % (ref 40–54)
HCT VFR BLD AUTO: 21.5 % (ref 40–54)
HCT VFR BLD AUTO: 21.8 % (ref 40–54)
HCT VFR BLD AUTO: 21.9 % (ref 40–54)
HCT VFR BLD AUTO: 22.9 % (ref 40–54)
HCT VFR BLD AUTO: 23.1 % (ref 40–54)
HCT VFR BLD AUTO: 23.3 % (ref 40–54)
HCT VFR BLD AUTO: 23.4 % (ref 40–54)
HCT VFR BLD AUTO: 23.6 % (ref 40–54)
HCT VFR BLD AUTO: 23.7 % (ref 40–54)
HCT VFR BLD AUTO: 24 % (ref 40–54)
HCT VFR BLD AUTO: 24.1 % (ref 40–54)
HCT VFR BLD AUTO: 24.2 % (ref 40–54)
HCT VFR BLD AUTO: 24.2 % (ref 40–54)
HCT VFR BLD AUTO: 24.6 % (ref 40–54)
HCT VFR BLD AUTO: 24.7 % (ref 40–54)
HCT VFR BLD AUTO: 24.8 % (ref 40–54)
HCT VFR BLD AUTO: 25 % (ref 40–54)
HCT VFR BLD AUTO: 25.1 % (ref 40–54)
HCT VFR BLD AUTO: 25.2 % (ref 40–54)
HCT VFR BLD AUTO: 25.4 % (ref 40–54)
HCT VFR BLD AUTO: 25.5 % (ref 40–54)
HCT VFR BLD AUTO: 25.5 % (ref 40–54)
HCT VFR BLD AUTO: 25.6 % (ref 40–54)
HCT VFR BLD AUTO: 25.7 % (ref 40–54)
HCT VFR BLD AUTO: 26 % (ref 40–54)
HCT VFR BLD AUTO: 26.7 % (ref 40–54)
HCT VFR BLD AUTO: 26.8 % (ref 40–54)
HCT VFR BLD AUTO: 27 % (ref 40–54)
HCT VFR BLD AUTO: 27.2 % (ref 40–54)
HCT VFR BLD AUTO: 27.2 % (ref 40–54)
HCT VFR BLD AUTO: 27.7 % (ref 40–54)
HCT VFR BLD AUTO: 28.8 % (ref 40–54)
HCT VFR BLD AUTO: 28.9 % (ref 40–54)
HCT VFR BLD AUTO: 30.2 % (ref 40–54)
HCT VFR BLD AUTO: 32.4 % (ref 40–54)
HGB BLD-MCNC: 11.1 G/DL (ref 14–18)
HGB BLD-MCNC: 6.7 G/DL (ref 14–18)
HGB BLD-MCNC: 7 G/DL (ref 14–18)
HGB BLD-MCNC: 7.1 G/DL (ref 14–18)
HGB BLD-MCNC: 7.2 G/DL (ref 14–18)
HGB BLD-MCNC: 7.4 G/DL (ref 14–18)
HGB BLD-MCNC: 7.4 G/DL (ref 14–18)
HGB BLD-MCNC: 7.5 G/DL (ref 14–18)
HGB BLD-MCNC: 7.6 G/DL (ref 14–18)
HGB BLD-MCNC: 7.7 G/DL (ref 14–18)
HGB BLD-MCNC: 7.8 G/DL (ref 14–18)
HGB BLD-MCNC: 7.9 G/DL (ref 14–18)
HGB BLD-MCNC: 7.9 G/DL (ref 14–18)
HGB BLD-MCNC: 8 G/DL (ref 14–18)
HGB BLD-MCNC: 8.2 G/DL (ref 14–18)
HGB BLD-MCNC: 8.3 G/DL (ref 14–18)
HGB BLD-MCNC: 8.3 G/DL (ref 14–18)
HGB BLD-MCNC: 8.5 G/DL (ref 14–18)
HGB BLD-MCNC: 8.6 G/DL (ref 14–18)
HGB BLD-MCNC: 8.6 G/DL (ref 14–18)
HGB BLD-MCNC: 8.7 G/DL (ref 14–18)
HGB BLD-MCNC: 8.7 G/DL (ref 14–18)
HGB BLD-MCNC: 8.8 G/DL (ref 14–18)
HGB BLD-MCNC: 8.9 G/DL (ref 14–18)
HGB BLD-MCNC: 9.2 G/DL (ref 14–18)
HGB BLD-MCNC: 9.2 G/DL (ref 14–18)
HGB BLD-MCNC: 9.8 G/DL (ref 14–18)
HGB UR QL STRIP: NEGATIVE
HYALINE CASTS #/AREA URNS LPF: ABNORMAL LPF
KETONES UR STRIP-MCNC: NEGATIVE MG/DL
LEUKOCYTE ESTERASE UR QL STRIP: NEGATIVE
LYMPHOCYTES # BLD AUTO: 4.4 THOU/UL (ref 0.8–4.4)
LYMPHOCYTES # BLD AUTO: 4.5 THOU/UL (ref 0.8–4.4)
LYMPHOCYTES # BLD AUTO: 5 THOU/UL (ref 0.8–4.4)
LYMPHOCYTES # BLD AUTO: 5.1 THOU/UL (ref 0.8–4.4)
LYMPHOCYTES # BLD AUTO: 5.1 THOU/UL (ref 0.8–4.4)
LYMPHOCYTES # BLD AUTO: 5.4 THOU/UL (ref 0.8–4.4)
LYMPHOCYTES # BLD AUTO: 5.5 THOU/UL (ref 0.8–4.4)
LYMPHOCYTES # BLD AUTO: 5.6 THOU/UL (ref 0.8–4.4)
LYMPHOCYTES # BLD AUTO: 5.9 THOU/UL (ref 0.8–4.4)
LYMPHOCYTES # BLD AUTO: 6 THOU/UL (ref 0.8–4.4)
LYMPHOCYTES # BLD AUTO: 6.3 THOU/UL (ref 0.8–4.4)
LYMPHOCYTES # BLD AUTO: 7.7 THOU/UL (ref 0.8–4.4)
LYMPHOCYTES # BLD AUTO: 7.7 THOU/UL (ref 0.8–4.4)
LYMPHOCYTES # BLD AUTO: 8.4 THOU/UL (ref 0.8–4.4)
LYMPHOCYTES # BLD AUTO: 8.7 THOU/UL (ref 0.8–4.4)
LYMPHOCYTES # BLD AUTO: 9.6 THOU/UL (ref 0.8–4.4)
LYMPHOCYTES NFR BLD AUTO: 19 % (ref 20–40)
LYMPHOCYTES NFR BLD AUTO: 22 % (ref 20–40)
LYMPHOCYTES NFR BLD AUTO: 30 % (ref 20–40)
LYMPHOCYTES NFR BLD AUTO: 37 % (ref 20–40)
LYMPHOCYTES NFR BLD AUTO: 41 % (ref 20–40)
LYMPHOCYTES NFR BLD AUTO: 42 % (ref 20–40)
LYMPHOCYTES NFR BLD AUTO: 43 % (ref 20–40)
LYMPHOCYTES NFR BLD AUTO: 43 % (ref 20–40)
LYMPHOCYTES NFR BLD AUTO: 50 % (ref 20–40)
LYMPHOCYTES NFR BLD AUTO: 51 % (ref 20–40)
LYMPHOCYTES NFR BLD AUTO: 52 % (ref 20–40)
LYMPHOCYTES NFR BLD AUTO: 55 % (ref 20–40)
LYMPHOCYTES NFR BLD AUTO: 58 % (ref 20–40)
LYMPHOCYTES NFR BLD AUTO: 58 % (ref 20–40)
LYMPHOCYTES NFR BLD AUTO: 59 % (ref 20–40)
LYMPHOCYTES NFR BLD AUTO: 62 % (ref 20–40)
LYMPHOCYTES NFR BLD AUTO: 62 % (ref 20–40)
LYMPHOCYTES NFR BLD AUTO: 69 % (ref 20–40)
MANUAL NRBC PER 100 CELLS: 1
MCH RBC QN AUTO: 34.9 PG (ref 27–34)
MCH RBC QN AUTO: 35.1 PG (ref 27–34)
MCH RBC QN AUTO: 35.1 PG (ref 27–34)
MCH RBC QN AUTO: 35.8 PG (ref 27–34)
MCH RBC QN AUTO: 37.2 PG (ref 27–34)
MCH RBC QN AUTO: 37.3 PG (ref 27–34)
MCH RBC QN AUTO: 37.3 PG (ref 27–34)
MCH RBC QN AUTO: 37.4 PG (ref 27–34)
MCH RBC QN AUTO: 37.5 PG (ref 27–34)
MCH RBC QN AUTO: 37.6 PG (ref 27–34)
MCH RBC QN AUTO: 37.7 PG (ref 27–34)
MCH RBC QN AUTO: 37.8 PG (ref 27–34)
MCH RBC QN AUTO: 38 PG (ref 27–34)
MCH RBC QN AUTO: 38.1 PG (ref 27–34)
MCH RBC QN AUTO: 38.2 PG (ref 27–34)
MCH RBC QN AUTO: 38.2 PG (ref 27–34)
MCH RBC QN AUTO: 38.5 PG (ref 27–34)
MCH RBC QN AUTO: 38.6 PG (ref 27–34)
MCH RBC QN AUTO: 38.9 PG (ref 27–34)
MCH RBC QN AUTO: 39.1 PG (ref 27–34)
MCH RBC QN AUTO: 39.3 PG (ref 27–34)
MCH RBC QN AUTO: 39.3 PG (ref 27–34)
MCHC RBC AUTO-ENTMCNC: 30.7 G/DL (ref 32–36)
MCHC RBC AUTO-ENTMCNC: 30.8 G/DL (ref 32–36)
MCHC RBC AUTO-ENTMCNC: 31.8 G/DL (ref 32–36)
MCHC RBC AUTO-ENTMCNC: 31.8 G/DL (ref 32–36)
MCHC RBC AUTO-ENTMCNC: 31.9 G/DL (ref 32–36)
MCHC RBC AUTO-ENTMCNC: 31.9 G/DL (ref 32–36)
MCHC RBC AUTO-ENTMCNC: 32 G/DL (ref 32–36)
MCHC RBC AUTO-ENTMCNC: 32.1 G/DL (ref 32–36)
MCHC RBC AUTO-ENTMCNC: 32.2 G/DL (ref 32–36)
MCHC RBC AUTO-ENTMCNC: 32.3 G/DL (ref 32–36)
MCHC RBC AUTO-ENTMCNC: 32.4 G/DL (ref 32–36)
MCHC RBC AUTO-ENTMCNC: 32.5 G/DL (ref 32–36)
MCHC RBC AUTO-ENTMCNC: 32.6 G/DL (ref 32–36)
MCHC RBC AUTO-ENTMCNC: 32.7 G/DL (ref 32–36)
MCHC RBC AUTO-ENTMCNC: 33 G/DL (ref 32–36)
MCHC RBC AUTO-ENTMCNC: 33 G/DL (ref 32–36)
MCHC RBC AUTO-ENTMCNC: 33.2 G/DL (ref 32–36)
MCHC RBC AUTO-ENTMCNC: 33.2 G/DL (ref 32–36)
MCHC RBC AUTO-ENTMCNC: 34.3 G/DL (ref 32–36)
MCV RBC AUTO: 106 FL (ref 80–100)
MCV RBC AUTO: 106 FL (ref 80–100)
MCV RBC AUTO: 109 FL (ref 80–100)
MCV RBC AUTO: 109 FL (ref 80–100)
MCV RBC AUTO: 110 FL (ref 80–100)
MCV RBC AUTO: 115 FL (ref 80–100)
MCV RBC AUTO: 116 FL (ref 80–100)
MCV RBC AUTO: 117 FL (ref 80–100)
MCV RBC AUTO: 118 FL (ref 80–100)
MCV RBC AUTO: 119 FL (ref 80–100)
MCV RBC AUTO: 119 FL (ref 80–100)
MCV RBC AUTO: 120 FL (ref 80–100)
MCV RBC AUTO: 120 FL (ref 80–100)
MCV RBC AUTO: 121 FL (ref 80–100)
MCV RBC AUTO: 121 FL (ref 80–100)
MCV RBC AUTO: 122 FL (ref 80–100)
MCV RBC AUTO: 122 FL (ref 80–100)
METAMYELOCYTES (ABSOLUTE): 0 THOU/UL
METAMYELOCYTES (ABSOLUTE): 0.1 THOU/UL
METAMYELOCYTES (ABSOLUTE): 0.2 THOU/UL
METAMYELOCYTES (ABSOLUTE): 0.4 THOU/UL
METAMYELOCYTES (ABSOLUTE): 0.5 THOU/UL
METAMYELOCYTES (ABSOLUTE): 0.6 THOU/UL
METAMYELOCYTES (ABSOLUTE): 1.6 THOU/UL
METAMYELOCYTES NFR BLD MANUAL: 1 %
METAMYELOCYTES NFR BLD MANUAL: 2 %
METAMYELOCYTES NFR BLD MANUAL: 3 %
METAMYELOCYTES NFR BLD MANUAL: 4 %
MONOCYTES # BLD AUTO: 1 THOU/UL (ref 0–0.9)
MONOCYTES # BLD AUTO: 1.9 THOU/UL (ref 0–0.9)
MONOCYTES # BLD AUTO: 11.6 THOU/UL (ref 0–0.9)
MONOCYTES # BLD AUTO: 2 THOU/UL (ref 0–0.9)
MONOCYTES # BLD AUTO: 2.1 THOU/UL (ref 0–0.9)
MONOCYTES # BLD AUTO: 2.4 THOU/UL (ref 0–0.9)
MONOCYTES # BLD AUTO: 2.5 THOU/UL (ref 0–0.9)
MONOCYTES # BLD AUTO: 2.8 THOU/UL (ref 0–0.9)
MONOCYTES # BLD AUTO: 2.9 THOU/UL (ref 0–0.9)
MONOCYTES # BLD AUTO: 3 THOU/UL (ref 0–0.9)
MONOCYTES # BLD AUTO: 3.1 THOU/UL (ref 0–0.9)
MONOCYTES # BLD AUTO: 3.4 THOU/UL (ref 0–0.9)
MONOCYTES # BLD AUTO: 3.5 THOU/UL (ref 0–0.9)
MONOCYTES # BLD AUTO: 3.7 THOU/UL (ref 0–0.9)
MONOCYTES # BLD AUTO: 4.1 THOU/UL (ref 0–0.9)
MONOCYTES # BLD AUTO: 4.8 THOU/UL (ref 0–0.9)
MONOCYTES # BLD AUTO: 9.1 THOU/UL (ref 0–0.9)
MONOCYTES # BLD AUTO: 9.4 THOU/UL (ref 0–0.9)
MONOCYTES NFR BLD AUTO: 16 % (ref 2–10)
MONOCYTES NFR BLD AUTO: 16 % (ref 2–10)
MONOCYTES NFR BLD AUTO: 19 % (ref 2–10)
MONOCYTES NFR BLD AUTO: 20 % (ref 2–10)
MONOCYTES NFR BLD AUTO: 24 % (ref 2–10)
MONOCYTES NFR BLD AUTO: 24 % (ref 2–10)
MONOCYTES NFR BLD AUTO: 25 % (ref 2–10)
MONOCYTES NFR BLD AUTO: 25 % (ref 2–10)
MONOCYTES NFR BLD AUTO: 26 % (ref 2–10)
MONOCYTES NFR BLD AUTO: 26 % (ref 2–10)
MONOCYTES NFR BLD AUTO: 27 % (ref 2–10)
MONOCYTES NFR BLD AUTO: 28 % (ref 2–10)
MONOCYTES NFR BLD AUTO: 29 % (ref 2–10)
MONOCYTES NFR BLD AUTO: 30 % (ref 2–10)
MONOCYTES NFR BLD AUTO: 31 % (ref 2–10)
MONOCYTES NFR BLD AUTO: 32 % (ref 2–10)
MONOCYTES NFR BLD AUTO: 34 % (ref 2–10)
MONOCYTES NFR BLD AUTO: 36 % (ref 2–10)
MYELOCYTES (ABSOLUTE): 0 THOU/UL
MYELOCYTES (ABSOLUTE): 0 THOU/UL
MYELOCYTES (ABSOLUTE): 0.1 THOU/UL
MYELOCYTES (ABSOLUTE): 0.3 THOU/UL
MYELOCYTES (ABSOLUTE): 0.4 THOU/UL
MYELOCYTES (ABSOLUTE): 0.4 THOU/UL
MYELOCYTES (ABSOLUTE): 0.5 THOU/UL
MYELOCYTES (ABSOLUTE): 0.6 THOU/UL
MYELOCYTES (ABSOLUTE): 1.3 THOU/UL
MYELOCYTES (ABSOLUTE): 1.5 THOU/UL
MYELOCYTES (ABSOLUTE): 2.7 THOU/UL
MYELOCYTES NFR BLD MANUAL: 1 %
MYELOCYTES NFR BLD MANUAL: 2 %
MYELOCYTES NFR BLD MANUAL: 2 %
MYELOCYTES NFR BLD MANUAL: 3 %
MYELOCYTES NFR BLD MANUAL: 4 %
MYELOCYTES NFR BLD MANUAL: 4 %
MYELOCYTES NFR BLD MANUAL: 6 %
MYELOCYTES NFR BLD MANUAL: 7 %
NITRATE UR QL: NEGATIVE
OTHER CELLS # BLD MANUAL: 0.4 THOU/UL
OTHER CELLS # BLD MANUAL: 0.5 THOU/UL
OTHER CELLS # BLD MANUAL: 1 THOU/UL
OTHER CELLS NFR BLD MANUAL: 3 %
OTHER CELLS NFR BLD MANUAL: 4 %
OTHER CELLS NFR BLD MANUAL: 5 %
OVALOCYTES: ABNORMAL
PH UR STRIP: 5.5 [PH] (ref 4.5–8)
PLAT MORPH BLD: ABNORMAL
PLAT MORPH BLD: NORMAL
PLATELET # BLD AUTO: 139 THOU/UL (ref 140–440)
PLATELET # BLD AUTO: 149 THOU/UL (ref 140–440)
PLATELET # BLD AUTO: 157 THOU/UL (ref 140–440)
PLATELET # BLD AUTO: 192 THOU/UL (ref 140–440)
PLATELET # BLD AUTO: 198 THOU/UL (ref 140–440)
PLATELET # BLD AUTO: 201 THOU/UL (ref 140–440)
PLATELET # BLD AUTO: 212 THOU/UL (ref 140–440)
PLATELET # BLD AUTO: 221 THOU/UL (ref 140–440)
PLATELET # BLD AUTO: 226 THOU/UL (ref 140–440)
PLATELET # BLD AUTO: 226 THOU/UL (ref 140–440)
PLATELET # BLD AUTO: 231 THOU/UL (ref 140–440)
PLATELET # BLD AUTO: 233 THOU/UL (ref 140–440)
PLATELET # BLD AUTO: 234 THOU/UL (ref 140–440)
PLATELET # BLD AUTO: 240 THOU/UL (ref 140–440)
PLATELET # BLD AUTO: 250 THOU/UL (ref 140–440)
PLATELET # BLD AUTO: 251 THOU/UL (ref 140–440)
PLATELET # BLD AUTO: 254 THOU/UL (ref 140–440)
PLATELET # BLD AUTO: 256 THOU/UL (ref 140–440)
PLATELET # BLD AUTO: 258 THOU/UL (ref 140–440)
PLATELET # BLD AUTO: 263 THOU/UL (ref 140–440)
PLATELET # BLD AUTO: 273 THOU/UL (ref 140–440)
PLATELET # BLD AUTO: 277 THOU/UL (ref 140–440)
PLATELET # BLD AUTO: 282 THOU/UL (ref 140–440)
PLATELET # BLD AUTO: 284 THOU/UL (ref 140–440)
PLATELET # BLD AUTO: 288 THOU/UL (ref 140–440)
PLATELET # BLD AUTO: 289 THOU/UL (ref 140–440)
PLATELET # BLD AUTO: 291 THOU/UL (ref 140–440)
PLATELET # BLD AUTO: 297 THOU/UL (ref 140–440)
PLATELET # BLD AUTO: 299 THOU/UL (ref 140–440)
PLATELET # BLD AUTO: 304 THOU/UL (ref 140–440)
PLATELET # BLD AUTO: 317 THOU/UL (ref 140–440)
PLATELET # BLD AUTO: 321 THOU/UL (ref 140–440)
PLATELET # BLD AUTO: 323 THOU/UL (ref 140–440)
PLATELET # BLD AUTO: 330 THOU/UL (ref 140–440)
PMV BLD AUTO: 10 FL (ref 8.5–12.5)
PMV BLD AUTO: 10.1 FL (ref 8.5–12.5)
PMV BLD AUTO: 10.2 FL (ref 8.5–12.5)
PMV BLD AUTO: 10.2 FL (ref 8.5–12.5)
PMV BLD AUTO: 10.6 FL (ref 8.5–12.5)
PMV BLD AUTO: 10.7 FL (ref 8.5–12.5)
PMV BLD AUTO: 10.7 FL (ref 8.5–12.5)
PMV BLD AUTO: 9.4 FL (ref 8.5–12.5)
PMV BLD AUTO: 9.5 FL (ref 8.5–12.5)
PMV BLD AUTO: 9.6 FL (ref 8.5–12.5)
PMV BLD AUTO: 9.7 FL (ref 8.5–12.5)
PMV BLD AUTO: 9.8 FL (ref 8.5–12.5)
PMV BLD AUTO: 9.9 FL (ref 8.5–12.5)
POLYCHROMASIA BLD QL SMEAR: ABNORMAL
POTASSIUM BLD-SCNC: 4 MMOL/L (ref 3.5–5)
POTASSIUM BLD-SCNC: 4.1 MMOL/L (ref 3.5–5)
PROMYELOCYTES # BLD MANUAL: 0.2 THOU/UL
PROMYELOCYTES NFR BLD MANUAL: 1 %
PROT SERPL-MCNC: 6.5 G/DL (ref 6–8)
RBC # BLD AUTO: 1.78 MILL/UL (ref 4.4–6.2)
RBC # BLD AUTO: 1.84 MILL/UL (ref 4.4–6.2)
RBC # BLD AUTO: 1.91 MILL/UL (ref 4.4–6.2)
RBC # BLD AUTO: 1.93 MILL/UL (ref 4.4–6.2)
RBC # BLD AUTO: 1.94 MILL/UL (ref 4.4–6.2)
RBC # BLD AUTO: 1.94 MILL/UL (ref 4.4–6.2)
RBC # BLD AUTO: 1.96 MILL/UL (ref 4.4–6.2)
RBC # BLD AUTO: 2.01 MILL/UL (ref 4.4–6.2)
RBC # BLD AUTO: 2.02 MILL/UL (ref 4.4–6.2)
RBC # BLD AUTO: 2.04 MILL/UL (ref 4.4–6.2)
RBC # BLD AUTO: 2.05 MILL/UL (ref 4.4–6.2)
RBC # BLD AUTO: 2.06 MILL/UL (ref 4.4–6.2)
RBC # BLD AUTO: 2.07 MILL/UL (ref 4.4–6.2)
RBC # BLD AUTO: 2.13 MILL/UL (ref 4.4–6.2)
RBC # BLD AUTO: 2.14 MILL/UL (ref 4.4–6.2)
RBC # BLD AUTO: 2.14 MILL/UL (ref 4.4–6.2)
RBC # BLD AUTO: 2.18 MILL/UL (ref 4.4–6.2)
RBC # BLD AUTO: 2.19 MILL/UL (ref 4.4–6.2)
RBC # BLD AUTO: 2.2 MILL/UL (ref 4.4–6.2)
RBC # BLD AUTO: 2.21 MILL/UL (ref 4.4–6.2)
RBC # BLD AUTO: 2.22 MILL/UL (ref 4.4–6.2)
RBC # BLD AUTO: 2.28 MILL/UL (ref 4.4–6.2)
RBC # BLD AUTO: 2.3 MILL/UL (ref 4.4–6.2)
RBC # BLD AUTO: 2.31 MILL/UL (ref 4.4–6.2)
RBC # BLD AUTO: 2.31 MILL/UL (ref 4.4–6.2)
RBC # BLD AUTO: 2.33 MILL/UL (ref 4.4–6.2)
RBC # BLD AUTO: 2.34 MILL/UL (ref 4.4–6.2)
RBC # BLD AUTO: 2.46 MILL/UL (ref 4.4–6.2)
RBC # BLD AUTO: 2.47 MILL/UL (ref 4.4–6.2)
RBC # BLD AUTO: 2.49 MILL/UL (ref 4.4–6.2)
RBC # BLD AUTO: 2.6 MILL/UL (ref 4.4–6.2)
RBC # BLD AUTO: 2.96 MILL/UL (ref 4.4–6.2)
RBC #/AREA URNS AUTO: ABNORMAL HPF
REACTIVE LYMPHS: ABNORMAL
SODIUM SERPL-SCNC: 137 MMOL/L (ref 136–145)
SODIUM SERPL-SCNC: 139 MMOL/L (ref 136–145)
SP GR UR STRIP: 1.01 (ref 1–1.03)
SQUAMOUS #/AREA URNS AUTO: ABNORMAL LPF
TOTAL NEUTROPHILS-ABS(DIFF): 0.9 THOU/UL (ref 2–7.7)
TOTAL NEUTROPHILS-ABS(DIFF): 1 THOU/UL (ref 2–7.7)
TOTAL NEUTROPHILS-ABS(DIFF): 1.1 THOU/UL (ref 2–7.7)
TOTAL NEUTROPHILS-ABS(DIFF): 1.1 THOU/UL (ref 2–7.7)
TOTAL NEUTROPHILS-ABS(DIFF): 1.2 THOU/UL (ref 2–7.7)
TOTAL NEUTROPHILS-ABS(DIFF): 1.6 THOU/UL (ref 2–7.7)
TOTAL NEUTROPHILS-ABS(DIFF): 1.9 THOU/UL (ref 2–7.7)
TOTAL NEUTROPHILS-ABS(DIFF): 15 THOU/UL (ref 2–7.7)
TOTAL NEUTROPHILS-ABS(DIFF): 18.7 THOU/UL (ref 2–7.7)
TOTAL NEUTROPHILS-ABS(DIFF): 2 THOU/UL (ref 2–7.7)
TOTAL NEUTROPHILS-ABS(DIFF): 2.4 THOU/UL (ref 2–7.7)
TOTAL NEUTROPHILS-ABS(DIFF): 2.6 THOU/UL (ref 2–7.7)
TOTAL NEUTROPHILS-ABS(DIFF): 2.7 THOU/UL (ref 2–7.7)
TOTAL NEUTROPHILS-ABS(DIFF): 3.8 THOU/UL (ref 2–7.7)
TOTAL NEUTROPHILS-ABS(DIFF): 3.8 THOU/UL (ref 2–7.7)
TOTAL NEUTROPHILS-ABS(DIFF): 5.6 THOU/UL (ref 2–7.7)
TOTAL NEUTROPHILS-ABS(DIFF): 5.9 THOU/UL (ref 2–7.7)
TOTAL NEUTROPHILS-ABS(DIFF): 8.2 THOU/UL (ref 2–7.7)
TOTAL NEUTROPHILS-REL(DIFF): 11 % (ref 50–70)
TOTAL NEUTROPHILS-REL(DIFF): 14 % (ref 50–70)
TOTAL NEUTROPHILS-REL(DIFF): 16 % (ref 50–70)
TOTAL NEUTROPHILS-REL(DIFF): 17 % (ref 50–70)
TOTAL NEUTROPHILS-REL(DIFF): 18 % (ref 50–70)
TOTAL NEUTROPHILS-REL(DIFF): 19 % (ref 50–70)
TOTAL NEUTROPHILS-REL(DIFF): 22 % (ref 50–70)
TOTAL NEUTROPHILS-REL(DIFF): 22 % (ref 50–70)
TOTAL NEUTROPHILS-REL(DIFF): 28 % (ref 50–70)
TOTAL NEUTROPHILS-REL(DIFF): 28 % (ref 50–70)
TOTAL NEUTROPHILS-REL(DIFF): 29 % (ref 50–70)
TOTAL NEUTROPHILS-REL(DIFF): 30 % (ref 50–70)
TOTAL NEUTROPHILS-REL(DIFF): 32 % (ref 50–70)
TOTAL NEUTROPHILS-REL(DIFF): 42 % (ref 50–70)
TOTAL NEUTROPHILS-REL(DIFF): 47 % (ref 50–70)
TOXIC GRANULATION: ABNORMAL
UROBILINOGEN UR STRIP-ACNC: ABNORMAL
WBC #/AREA URNS AUTO: ABNORMAL HPF
WBC: 10.2 THOU/UL (ref 4–11)
WBC: 10.3 THOU/UL (ref 4–11)
WBC: 10.8 THOU/UL (ref 4–11)
WBC: 10.9 THOU/UL (ref 4–11)
WBC: 11.1 THOU/UL (ref 4–11)
WBC: 11.4 THOU/UL (ref 4–11)
WBC: 11.9 THOU/UL (ref 4–11)
WBC: 12.1 THOU/UL (ref 4–11)
WBC: 12.5 THOU/UL (ref 4–11)
WBC: 12.8 THOU/UL (ref 4–11)
WBC: 13 THOU/UL (ref 4–11)
WBC: 13.2 THOU/UL (ref 4–11)
WBC: 13.2 THOU/UL (ref 4–11)
WBC: 13.5 THOU/UL (ref 4–11)
WBC: 13.5 THOU/UL (ref 4–11)
WBC: 17.7 THOU/UL (ref 4–11)
WBC: 19.6 THOU/UL (ref 4–11)
WBC: 21.5 THOU/UL (ref 4–11)
WBC: 25.6 THOU/UL (ref 4–11)
WBC: 32.3 THOU/UL (ref 4–11)
WBC: 44.5 THOU/UL (ref 4–11)
WBC: 6.1 THOU/UL (ref 4–11)
WBC: 6.2 THOU/UL (ref 4–11)
WBC: 6.3 THOU/UL (ref 4–11)
WBC: 6.4 THOU/UL (ref 4–11)
WBC: 7.8 THOU/UL (ref 4–11)
WBC: 7.9 THOU/UL (ref 4–11)
WBC: 8.2 THOU/UL (ref 4–11)
WBC: 8.3 THOU/UL (ref 4–11)
WBC: 8.5 THOU/UL (ref 4–11)
WBC: 9.5 THOU/UL (ref 4–11)
WBC: 9.6 THOU/UL (ref 4–11)
WBC: 9.8 THOU/UL (ref 4–11)
WBC: 9.8 THOU/UL (ref 4–11)

## 2018-03-19 NOTE — PROGRESS NOTES
Writer spoke with Aliya Escalante NP at [facility]. Patient is currently residing in a nursing home. Patient care was transferred from Dr Dai as he has MRD positive blasts. No further aggressive therapy is suggested. Patient with right lower leg neuropathy, significant pain and peripheral vascular disease. Patient scheduled for right lower leg amputation on 03/22/18.

## 2018-09-05 ENCOUNTER — CARE COORDINATION (OUTPATIENT)
Dept: TRANSPLANT | Facility: CLINIC | Age: 73
End: 2018-09-05

## 2018-09-05 NOTE — PROGRESS NOTES
Writer has attempted multiple times to contact patient provider at [facility] in [facility] with no confirmation on patient status. Patient is due for his 3 year follow up post bone marrow transplant.   Patient will not have a documented status for this time point.

## 2019-01-14 NOTE — LETTER
8/10/2017      RE: Garcia Lara  [address] [place] [address]     Dear Colleague,    Thank you for referring your patient, Garcia Lara, to the [facility] BLOOD AND MARROW TRANSPLANT. Please see a copy of my visit note below.    BMT Clinic 2-year Anniversary Visit Note     CHIEF COMPLAINT:  Unfortunately, the patient still remains in a wheelchair, not able to walk, and he still has cognitive deficit requiring substantial support from family for his actual daily needs.      HISTORY OF PRESENT ILLNESS:  Mr. 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  In addition, he had bilateral DVTs and generalized edema.  He stayed in rehab for a prolonged period of time; and unfortunately, he continued having short-term and long-term memory problems.  Unfortunately, at his 1-year anniversary visit, he was found to have hepatitis B infection with LFT abnormalities requiring therapy and close followup with Hepatology team.      He is now home for a few months, and his wife is helping him greatly with his daily needs.  He reports having no active infectious issues at this time.      REVIEW OF SYSTEMS:  A 12-point review of systems was reviewed and was otherwise unremarkable.      PHYSICAL EXAMINATION:   VITAL SIGNS:  Temperature 98.8 Fahrenheit, respiratory rate is 16, pulse is 69, blood pressure 97/64, saturating 97% on room air.   GENERAL:  He is in no apparent distress, in a wheelchair and was able to or at least try to say my name.   HEAD AND NECK:  Pupils are equally round to light and  accommodation.  Oropharynx is clear.   CHEST:  Clear to auscultation bilaterally, no wheezes or crackles.   CARDIAC:  Regular rate and rhythm, no murmurs, rubs or gallops.   ABDOMEN:  Positive bowel sounds, soft, nontender and nondistended.  No palpable hepatosplenomegaly.   EXTREMITIES:  No pedal edema bilaterally.   LYMPH NODES:  Exam without focal findings.  However, he continues to have bilateral leg weakness.      LABORATORY DATA:  Summarized below.   Lab Results   Component Value Date    WBC 6.9 08/07/2017    ANEU 3.3 11/09/2016    HGB 12.5 (L) 08/07/2017    HCT 39.0 (L) 08/07/2017     08/07/2017     03/15/2017    POTASSIUM 3.9 03/15/2017    CHLORIDE 101 03/15/2017    CO2 28 03/15/2017     (H) 03/15/2017    BUN 14 03/15/2017    CR 0.76 03/15/2017    MAG 1.8 03/15/2017    INR 1.19 (H) 08/07/2017        ASSESSMENT AND PLAN:  Mr. Garcia Lara is a 71-year-old gentleman 2 years post-single extended cord blood non-myeloablative stem cell transplant for chronic myelomonocytic leukemia diagnosis with course unfortunately being complicated with mental status changes and short and long-term memory problems, as well as deconditioning.      1.  CMML:  We reviewed today year 2 anniversary visit tests with bone marrow biopsy morphologically showing no evidence of disease; however, by flow cytometry testing, there is a small population of cells that express aberrant CD56 marker on monocytes, granulocytes and blasts that is consistent with pretransplant abnormal clone questioning the possibility of minimal residual disease by flow cytometry.  I believe that this probably is the case for him to have evidence of minimal residual disease at this time given chimerism also dropping from original 100% 1 year ago to present 88% donor only in his bone marrow.  Peripheral blood also shows about 5% recipient in non- blood suggestive of evidence of disease recurrence.  I did discuss with the patient and  his wife in detail today that unfortunately we are concerned that in low level his disease is back.  Given the patient's clinical condition with cognitive problem and deconditioning, no further therapy would be recommended, particularly when we have no curative treatment options.  I would suggest at this point in time to follow up with Dr. Erwin who is his local oncologist on a monthly basis for count check to see how stable his counts remain.  If his disease happens to deteriorate rather fast, I would recommend proceeding with comfort measures and supportive care only involving hospice rather soon.  We did discuss that low-dose Vidaza can be considered as an outpatient; however, given his deconditioned state and cognitive changes, it would be futile to proceed with any therapy that is first not curative and second can further compromise his remaining quality of life.  His wife demonstrated good understanding of what we discussed, and we are going to make sure this note is being mailed to her, as well as being faxed to Dr. Erwin.      2.  Hematopoiesis.  Peripheral blood counts remain relatively stable without need for any blood product transfusions.      3.  Infectious disease.  He unfortunately was noticed to have hepatitis B infection at his 1-year anniversary visit; and therefore, he was referred to Hepatology and continuous to follow up with them.      At this point, he is due for his 2-year anniversary vaccines, which we administered today including VZV vaccine and MMR.      He did have multiple complications after his transplant that included enterococcal bacteremia in 09/2015 treated with vancomycin.  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  On 08/02/2015, he had Klebsiella pneumonia as well.      4.  Severe deconditioning.  The patient is home now and has been doing some home physical and occupational therapy before trying to get into outpatient schedule which is  definitely hard, but the family is greatly helping him with his care.      5.  Yoesl-xqdles-tdwr disease.  The patient had no signs or symptoms of ctbui-tvbyqq-jobt disease since the time of diagnosis.      6.  Endocrinology.  For his type 2 diabetes, he continued to follow up with Endocrinology team.      We will make no further appointments for him at BMT since this is the last visit per protocol at 2 years; and unfortunately, there is evidence of minimal residual disease by flow cytometry, as well as by chimerism testing.  As above, I recommended if his disease deteriorate rather fast to proceed with comfort care approach and hospice involvement, and I am happy to answer any questions if they come.  I can be reached [phone].       50 minute visit, greater than 90% spent on counseling and care coordination.     Again, thank you for allowing me to participate in the care of your patient.      Sincerely,    Meri Aly MD   no

## 2021-05-31 VITALS — BODY MASS INDEX: 35.84 KG/M2 | WEIGHT: 257 LBS

## 2021-05-31 VITALS — BODY MASS INDEX: 34.73 KG/M2 | WEIGHT: 249 LBS

## 2021-05-31 VITALS — WEIGHT: 248 LBS | BODY MASS INDEX: 34.59 KG/M2

## 2021-05-31 VITALS — BODY MASS INDEX: 34.03 KG/M2 | WEIGHT: 244 LBS

## 2021-06-01 VITALS — BODY MASS INDEX: 32.69 KG/M2 | WEIGHT: 234.4 LBS

## 2021-06-01 VITALS — BODY MASS INDEX: 34.95 KG/M2 | WEIGHT: 250.6 LBS

## 2021-06-01 VITALS — BODY MASS INDEX: 34.73 KG/M2 | WEIGHT: 249 LBS

## 2021-06-01 VITALS — WEIGHT: 238.8 LBS | BODY MASS INDEX: 33.31 KG/M2

## 2021-06-01 VITALS — BODY MASS INDEX: 34.84 KG/M2 | WEIGHT: 249.8 LBS

## 2021-06-01 VITALS — BODY MASS INDEX: 35.09 KG/M2 | WEIGHT: 251.6 LBS

## 2021-06-01 VITALS — WEIGHT: 248.8 LBS | BODY MASS INDEX: 34.7 KG/M2

## 2021-06-01 VITALS — WEIGHT: 248.6 LBS | BODY MASS INDEX: 34.67 KG/M2

## 2021-06-01 VITALS — BODY MASS INDEX: 34.62 KG/M2 | WEIGHT: 248.2 LBS

## 2021-06-01 VITALS — BODY MASS INDEX: 32.32 KG/M2 | WEIGHT: 231.7 LBS

## 2021-06-01 VITALS — BODY MASS INDEX: 37.52 KG/M2 | WEIGHT: 269 LBS

## 2021-06-01 VITALS — WEIGHT: 240.2 LBS | BODY MASS INDEX: 33.5 KG/M2

## 2021-06-14 NOTE — PROGRESS NOTES
Code Status:  DNR/DNI  Visit Type: H & P     Facility:  [facility] [[id number]]      Facility Type: SNF (Skilled Nursing Facility, TCU)    History of Present Illness:   Hospital Admission Date: Was not in the hospital came directly from home hospital Discharge Date:   Facility Admission Date: November 13, 2017 [facility] transitional care unit    Garcia Lara is a 71 y.o. male well-known to us from a previous admission in 2015 who has underlying chronic myelomonocytic leukemia, hepatitis B treated, underlying neuro cognitive impairment thought to be dementia, insulin requiring diabetes, nonischemic cardiomyopathy, angina, who had undergone a bone marrow transplant in July 2015 and later that year had some respiratory failure with extended hospitalization.  He comes to us because his wife who he lives with who is his caregiver has become ill herself and may even have mental illness and plan is for us to assist him in his cares through to at least spring    Past Medical History:   Diagnosis Date     Anemia 10/14/2015     Bacteremia 10/14/2015     CMML (chronic myelomonocytic leukemia)      Diabetes mellitus     type 2-insulin dependent     Diabetic peripheral neuropathy associated with type 2 diabetes mellitus      DVT (deep venous thrombosis) 10/14/2015    left L/E     Dysphagia 10/14/2015     Encephalopathy 10/14/2015     Foot drop, right 10/14/2015     Hematoma of muscle 11/10/15    Right psoas     History of agent Orange exposure     during [employer] war     History of angina pectoris      Hyperlipidemia      Hypertension      MDS (myelodysplastic syndrome)      Non-ischemic cardiomyopathy      Pneumonia      Sphenoid sinusitis      UTI (urinary tract infection) 10/14/2014    KLEBSIELLA     Past Surgical History:   Procedure Laterality Date     BONE MARROW TRANSPLANT  10/14/2015     COLONOSCOPY       GASTROSTOMY TUBE PLACEMENT  9/24/15     OTHER SURGICAL HISTORY  1986    [facility]  measurement, initial vessel     OTHER SURGICAL HISTORY  11/16/15    IVC filter placement     Family History   Problem Relation Age of Onset     Asthma Sister      Heart disease Brother      Asthma Son      Heart disease Brother      Social History     Social History     Marital status:      Spouse name: N/A     Number of children: N/A     Years of education: N/A     Occupational History     Not on file.     Social History Main Topics     Smoking status: Never Smoker     Smokeless tobacco: Never Used     Alcohol use No     Drug use: No     Sexual activity: Not Currently     Other Topics Concern     Not on file     Social History Narrative     No narrative on file     Additional Geriatric Review patient lives with his wife he is easily confused and not reliable historian former heavy smoker 2 children, worked in an office.  Not able to discern more information  Current Outpatient Prescriptions   Medication Sig Dispense Refill     acyclovir (ZOVIRAX) 200 mg/5 mL suspension Take 800 mg by mouth 5 (five) times a day.       AMLODIPINE 1 MG/ML SUSP (NORVASC) Take 10 mg by mouth daily.       dextrose (DEXTROSE) 40 % gel Take 15-30 g by mouth every 15 (fifteen) minutes as needed (for low blood sugar).        enoxaparin (LOVENOX) 40 mg/0.4 mL syringe Inject 40 mg under the skin daily.       glucagon, human recombinant, 1 mg/mL SolR injection Inject 1 mg into the shoulder, thigh, or buttocks as needed (inject every 15 min for low blood sugar (may repeat x1)). hypoglycemia       guar gum (NUTRISOURCE FIBER) Pack powder packet 2 packets by G-tube route 2 (two) times a day.       insulin aspart (NOVOLOG) 100 unit/mL injection Inject under the skin 3 (three) times a day before meals. Per sliding scale  140-164=1 units  165-189=2 units  190-214=3 units  315-239=4 units  240-264=5 units  265-289=6 units  290-314=7 units  315-339=8 units  340-364=9 units  = or >365 10 units       insulin aspart (NOVOLOG) 100 unit/mL injection  Inject under the skin. Every 4 hours during tube feed  140-149=1u, 150-159=2u, 160-169=3u, 170-179=4u, 180-189=5u, 190-199=6u, 200-209=7u, 210-219=8u, 220-229=9u, 230-239=10u, 240-249=11u, 250-259=12u, 260-269=13u, 270-279=14u, 280-289=15u, 290-299=16u, 300-209=17u, 310-319=18u, 320-329=19u, 330-339=20u, 340-349-21u, >350=22u       insulin NPH (HUMULIN N) 100 unit/mL injection Inject 15 Units under the skin every morning.        insulin NPH (HUMULIN N) 100 unit/mL injection Inject 60 Units under the skin every evening. DO NOT GIVE IF TUBE FEEDS ARE HELD       loperamide (IMODIUM) 1 mg/5 mL solution Take 2 mg by mouth 2 (two) times a day.       METOPROLOL TARTRATE (LOPRESSOR ORAL) Take 50 mg by mouth 2 (two) times a day. 10MG/ML solution, give 50mg oral or feeding tube       PANTOPRAZOLE SODIUM (PROTONIX ORAL) Take 40 mg by mouth daily. 2mg/mL solution, give 40mg       sirolimus (RAPAMUNE) 1 mg/mL solution Take 0.5 mg by mouth daily.       sulfamethoxazole-trimethoprim (SEPTRA) 200-40 mg/5 mL suspension 160 mg by G-tube route. On Mon, Tues two times daily, dose based on TMP component       UNABLE TO FIND Take 3 mg by mouth 3 (three) times a day. Med Name: Budesonide (ENTOCORT) 0.6mg/mL. Take 5mLs (3mg)       voriconazole (VFEND) 200 mg/5 mL (40 mg/mL) suspension Take 200 mg by mouth every 12 (twelve) hours.       No current facility-administered medications for this visit.      Allergies   Allergen Reactions     Ace Inhibitors Angiodema     Vancomycin Swelling     Patient developed lip swelling ~1-2 days after vancomycin was started at [facility], repeated with test dose, lips swelled.        There is no immunization history on file for this patient.      Review of Systems   Unable to perform ROS: Dementia        Physical Exam   Constitutional: He is oriented to person, place, and time. He appears well-developed and well-nourished.   HENT:   Head: Normocephalic and atraumatic.   Right Ear: External ear normal.   Left  Ear: External ear normal.   Nose: Nose normal.   Mouth/Throat: Oropharynx is clear and moist.   Eyes: Conjunctivae and EOM are normal. Pupils are equal, round, and reactive to light. Right eye exhibits no discharge. Left eye exhibits no discharge.   Neck: Neck supple. No JVD present. No tracheal deviation present. No thyromegaly present.   Cardiovascular: Normal rate, regular rhythm, normal heart sounds and intact distal pulses.    No murmur heard.  Pulmonary/Chest: Effort normal and breath sounds normal. No respiratory distress. He has no wheezes. He has no rales. He exhibits no tenderness.   Abdominal: He exhibits no distension and no mass. There is no tenderness. There is no guarding.   Musculoskeletal: Normal range of motion. He exhibits no edema or tenderness.   Lymphadenopathy:     He has no cervical adenopathy.   Neurological: He is alert and oriented to person, place, and time. He has normal reflexes. No cranial nerve deficit. Coordination normal.   Skin: Skin is warm and dry. No rash noted. No erythema.   Does have bilateral lower extremity very dry skin with some dry areas of seborrhea and toe right foot second that has some areas of eschar and damage   Psychiatric:   Patient is pleasant alert but not able to give any reliable information.  Is conversant but not able to do any recall.   Nursing note and vitals reviewed.        Labs:  All labs reviewed in the nursing home record.    Assessment:  1. Chronic myelomonocytic leukemia     2. Hepatitis B     3. Dementia     4. Diabetes mellitus with neuropathy     5. Hypertension     6. Hyperlipidemia         Plan: The last notes seem to indicate from October that [facility] and [facility] we will get a copy of the so we can corroborate that.  We will continue his regular medicines and going to add Aquaphor to the lower extremities for the significant dryness to his legs.  No other changes at this  time    Total 35 minutes of which 65% was spent in counseling and coordination of care of the above plan.    Electronically signed by: Fred Odell MD

## 2021-06-15 NOTE — PROGRESS NOTES
Code Status:  DNR/DNI  Visit Type: Problem Visit (High blood sugars, boils)     Facility:  [facility] [[id number]]        Facility Type: SNF (Skilled Nursing Facility, TCU)    History of Present Illness: Garcia Lara is a 72 y.o. male with known dementia who I was asked to see today because of severe poor blood sugar control.  In the process of seeing him and asking him if he had any concerns he showed me some skin welts that he had.  Going back in the record there was some discussion of him remotely being treated with Augmentin for welts.  When he came to the facility this was discontinued.  He has had no fever or chills with this.  His history is not reliable.    Review of Systems     Physical Exam   Constitutional:   Vital sign review shows no fever or vital sign irregularities.  His weight however has gone from 257 on admission to 249 at this time.  Blood sugars are significantly abnormal with numbers averaging between 350 and 425.  This is despite 1 increase in Lantus.  Skin exam reveals a 4 cm round boil with a central exit point on his chest and one on his lower right abdomen.  There are documentation of ones on his arm as well these are not evident now.   Vitals reviewed.      Labs:  All labs reviewed in the nursing home record.    Assessment:  1. Boils     2. Diabetes mellitus with neuropathy         Plan: In regards to the diabetes the poor control may be in part related to his infection.  Because of that we are going to treat this aggressively with warm packs to the boils.  Augmentin 875 mg twice daily for 10 days.  Will get a CBC with differential, CRP, and sed rate to work this up.  In regards to the blood sugar irregularity we will increase the Lantus from 55 units to 62 units and ask for an update on Monday.  We will adjust his Coumadin as well with an INR of 3.0 to reduce 1 of his 2 7.5 days to just Sunday at 7.5 all other days 5 mg.  We will recheck the INR in 2 weeks      25 minutes  spent of which greater than 65% was face to face communication with the patient about above plan of care    Electronically signed by: Fred Odell MD

## 2021-06-15 NOTE — PROGRESS NOTES
Code Status:  DNR/DNI  Visit Type: Problem Visit (Persistent hyperglycemia and boils)     Facility:  [facility] [[id number]]        Facility Type: SNF (Skilled Nursing Facility, TCU)    History of Present Illness: Garcia Lara is a 72 y.o. male who I am seeing back with continued significantly high blood sugars.  Unfortunately our last daughter did not take it carried out so he remained on 62 units of Lantus at at bedtime 68 was the dose increase are requested.  His sugars have been routinely despite increasing the sliding scale in the 350-450 range.  Thankfully his boils have gotten better they have crusted over and trunk in size.    Review of Systems     Physical Exam   Constitutional:   Patient has been stable from a mentation point of view vital signs have been stable.  He has been cooperative.  His boils is noted particularly the one in the mid chest is now drier and reduced in size.  Also the one in the right flank area is.  I do not see any on his arms.   Vitals reviewed.      Labs:  All labs reviewed in the nursing home record.    Assessment:  1. Boils     2. Diabetes mellitus with neuropathy         Plan: We will request that they actually honor the order of going from 62 units to 68 units at at bedtime of Lantus but I will add an additional 8 units in the morning.  We will continue to do his sliding scale.  We will also continue the antibiotics and continue to monitor him for his boils.  Operatory repeating for inflammatory markers to try to demarcate improvement via that venue will be done on Wednesday.      25 minutes spent of which greater than 65% was face to face communication with the patient about above plan of care    Electronically signed by: Fred Odell MD

## 2021-06-15 NOTE — PROGRESS NOTES
[facility]    Facility:   [facility] [[id number]]   Code Status: DNR/DNI      CHIEF COMPLAINT/REASON FOR VISIT:  Chief Complaint   Patient presents with     Problem Visit     Anticoagulation Management       HISTORY:      HPI: Garcia is a 72 y.o. male with multiple medical problems including anemia, CMML, DMII, DVT, agent orange exposure, pneumonia, and UTI. He is currently being anticoagulated for history of DVT. He had his INR checked today at the facility and it was at goal, 2.1. He denies any uncontrolled bleeding, any active bleeding, any bruising, hematuria, epistaxis, blood in stools or black/dark/tarry stools. No other concerns noted.     Past Medical History:   Diagnosis Date     Anemia 10/14/2015     Bacteremia 10/14/2015     CMML (chronic myelomonocytic leukemia)      Diabetes mellitus     type 2-insulin dependent     Diabetic peripheral neuropathy associated with type 2 diabetes mellitus      DVT (deep venous thrombosis) 10/14/2015    left L/E     Dysphagia 10/14/2015     Encephalopathy 10/14/2015     Foot drop, right 10/14/2015     Hematoma of muscle 11/10/15    Right psoas     History of agent Orange exposure     during [employer] war     History of angina pectoris      Hyperlipidemia      Hypertension      MDS (myelodysplastic syndrome)      Non-ischemic cardiomyopathy      Pneumonia      Sphenoid sinusitis      UTI (urinary tract infection) 10/14/2014    KLEBSIELLA             Family History   Problem Relation Age of Onset     Asthma Sister      Heart disease Brother      Asthma Son      Heart disease Brother      Social History     Social History     Marital status:      Spouse name: N/A     Number of children: N/A     Years of education: N/A     Social History Main Topics     Smoking status: Never Smoker     Smokeless tobacco: Never Used     Alcohol use No     Drug use: No     Sexual activity: Not Currently     Other Topics Concern     None     Social History  Narrative       REVIEW OF SYSTEM:  Pertinent items are noted in HPI.    PHYSICAL EXAM:   /75  Pulse 72  Temp 97.9  F (36.6  C)  Resp 18  Wt (!) 249 lb (112.9 kg)  SpO2 96%  BMI 35.73 kg/m2  General appearance: alert, appears stated age and cooperative  Head: Normocephalic, without obvious abnormality, atraumatic  Lungs: respirations without effort  Skin: Skin color, texture, turgor normal. No rashes or lesions  Neurologic: Grossly normal      LABS:   INR in one week.     ASSESSMENT:      ICD-10-CM    1. Anticoagulation management encounter Z51.81     Z79.01        PLAN:    Anticoagulation Management  -INR at goal today, continue current coumadin dose (5.5 mg by mouth M/W/F/Saturday and 5 mg all other days).   -Monitor for bleeding or any other concerns.   -Recheck INR in one week.     Greater than 15 minutes spent with patient with at least 55% of this time spent on counseling, education, and discussion of the above care plan with nursing staff, and patient.     Electronically signed by: Julianne Rogers CNP

## 2021-06-15 NOTE — PROGRESS NOTES
Code Status:  DNR/DNI  Visit Type: Problem Visit (Hematological malignancy, dry gangrenous second toe right blood sugars elevated)     Facility:  [facility] [[id number]]        Facility Type: SNF (Skilled Nursing Facility, TCU)    History of Present Illness: Garcia Lara is a 72 y.o. male who recently has developed a area on his right second toe is become progressively more atretic and desiccated.  Nursing had asked me to see it today.  This is gentleman that has had very abnormal lab work secondary to his chronic myelomonocytic leukemia and recently a infection with boils which has been treated.  His blood sugars also continued to be in very poor control.    Review of Systems     Physical Exam   Constitutional:   Blood sugars are reviewed and clearly more towards the afternoon there is significantly higher than prior.  Barry mentation appears about the same he will respond he tends to sit with his head forward.  Careful examination of his lower extremities reveals some edema and dependent skin changes with thickening.  However his second toe on the right is now atretic desiccated and at its base looks blackened.  Underneath there appears to be some softening tissue.   Vitals reviewed.      Labs:  All labs reviewed in the nursing home record.  Recent Results (from the past 240 hour(s))   C-Reactive Protein (CRP)   Result Value Ref Range    CRP 14.2 (H) 0.0 - 0.8 mg/dL   Sedimentation Rate   Result Value Ref Range    Sed Rate 115 (H) 0 - 15 mm/hr   HM1 (CBC with Diff)   Result Value Ref Range    WBC 44.5 (HH) 4.0 - 11.0 thou/uL    RBC 2.60 (L) 4.40 - 6.20 mill/uL    Hemoglobin 9.8 (L) 14.0 - 18.0 g/dL    Hematocrit 30.2 (L) 40.0 - 54.0 %     (H) 80 - 100 fL    MCH 37.7 (H) 27.0 - 34.0 pg    MCHC 32.5 32.0 - 36.0 g/dL    RDW 12.5 11.0 - 14.5 %    Platelets 201 140 - 440 thou/uL    MPV 10.7 8.5 - 12.5 fL   Manual Differential   Result Value Ref Range    Total Neutrophils % 42 (L) 50 - 70 %     Lymphocytes % 22 20 - 40 %    Monocytes % 26 (H) 2 - 10 %    Eosinophils %  1 0 - 6 %    Basophils % 0 0 - 2 %    Metamyelocytes % 4 (H) <=1 %    Myelocytes % 6 (H) <=1 %    Promyelocytes % 1 (H) <=0 %    Total Neutrophils Absolute 18.7 (H) 2.0 - 7.7 thou/ul    Lymphocytes Absolute 9.6 (H) 0.8 - 4.4 thou/uL    Monocytes Absolute 11.6 (H) 0.0 - 0.9 thou/uL    Eosinophils Absolute 0.2 0.0 - 0.4 thou/uL    Basophils Absolute 0.0 0.0 - 0.2 thou/uL    Metamyelocytes Absolute 1.6 (H) <=0.1 thou/uL    Myelocytes Absolute 2.7 (H) <=0.1 thou/uL    Promyelocytes Absolute 0.2 (H) <=0.1 thou/uL    Platelet Estimate Normal Normal    Ovalocytes 1+ (!) Negative    Polychromasia 1+ (!) Negative   HM1 (CBC with Diff)   Result Value Ref Range    WBC 32.3 (HH) 4.0 - 11.0 thou/uL    RBC 2.47 (L) 4.40 - 6.20 mill/uL    Hemoglobin 9.2 (L) 14.0 - 18.0 g/dL    Hematocrit 28.8 (L) 40.0 - 54.0 %     (H) 80 - 100 fL    MCH 37.2 (H) 27.0 - 34.0 pg    MCHC 31.9 (L) 32.0 - 36.0 g/dL    RDW 12.5 11.0 - 14.5 %    Platelets 258 140 - 440 thou/uL    MPV 10.7 8.5 - 12.5 fL   Manual Differential   Result Value Ref Range    Total Neutrophils % 47 (L) 50 - 70 %    Lymphocytes % 19 (L) 20 - 40 %    Monocytes % 29 (H) 2 - 10 %    Eosinophils %  0 0 - 6 %    Basophils % 0 0 - 2 %    Metamyelocytes % 2 (H) <=1 %    Myelocytes % 4 (H) <=1 %    Total Neutrophils Absolute 15.0 (H) 2.0 - 7.7 thou/ul    Lymphocytes Absolute 6.0 (H) 0.8 - 4.4 thou/uL    Monocytes Absolute 9.4 (H) 0.0 - 0.9 thou/uL    Eosinophils Absolute 0.0 0.0 - 0.4 thou/uL    Basophils Absolute 0.0 0.0 - 0.2 thou/uL    Metamyelocytes Absolute 0.6 (H) <=0.1 thou/uL    Myelocytes Absolute 1.3 (H) <=0.1 thou/uL    Platelet Estimate Normal Normal   HM1 (CBC with Diff)   Result Value Ref Range    WBC 19.6 (H) 4.0 - 11.0 thou/uL    RBC 2.33 (L) 4.40 - 6.20 mill/uL    Hemoglobin 8.9 (L) 14.0 - 18.0 g/dL    Hematocrit 27.0 (L) 40.0 - 54.0 %     (H) 80 - 100 fL    MCH 38.2 (H) 27.0 - 34.0  pg    MCHC 33.0 32.0 - 36.0 g/dL    RDW 12.2 11.0 - 14.5 %    Platelets 297 140 - 440 thou/uL    MPV 10.1 8.5 - 12.5 fL   Manual Differential   Result Value Ref Range    Total Neutrophils % 29 (L) 50 - 70 %    Lymphocytes % 43 (H) 20 - 40 %    Monocytes % 25 (H) 2 - 10 %    Eosinophils %  0 0 - 6 %    Basophils % 0 0 - 2 %    Metamyelocytes % 1 <=1 %    Myelocytes % 3 (H) <=1 %    Total Neutrophils Absolute 5.6 2.0 - 7.7 thou/ul    Lymphocytes Absolute 8.4 (H) 0.8 - 4.4 thou/uL    Monocytes Absolute 4.8 (H) 0.0 - 0.9 thou/uL    Eosinophils Absolute 0.0 0.0 - 0.4 thou/uL    Basophils Absolute 0.0 0.0 - 0.2 thou/uL    Metamyelocytes Absolute 0.2 (H) <=0.1 thou/uL    Myelocytes Absolute 0.6 (H) <=0.1 thou/uL    Platelet Estimate Normal Normal    Polychromasia 1+ (!) Negative   HM1 (CBC with Diff)   Result Value Ref Range    WBC 12.8 (H) 4.0 - 11.0 thou/uL    RBC 2.04 (L) 4.40 - 6.20 mill/uL    Hemoglobin 7.6 (L) 14.0 - 18.0 g/dL    Hematocrit 23.4 (L) 40.0 - 54.0 %     (H) 80 - 100 fL    MCH 37.3 (H) 27.0 - 34.0 pg    MCHC 32.5 32.0 - 36.0 g/dL    RDW 12.8 11.0 - 14.5 %    Platelets 263 140 - 440 thou/uL    MPV 10.0 8.5 - 12.5 fL   Manual Differential   Result Value Ref Range    Total Neutrophils % 30 (L) 50 - 70 %    Lymphocytes % 43 (H) 20 - 40 %    Monocytes % 25 (H) 2 - 10 %    Eosinophils %  1 0 - 6 %    Basophils % 0 0 - 2 %    Myelocytes % 3 (H) <=1 %    Total Neutrophils Absolute 3.8 2.0 - 7.7 thou/ul    Lymphocytes Absolute 5.4 (H) 0.8 - 4.4 thou/uL    Monocytes Absolute 3.1 (H) 0.0 - 0.9 thou/uL    Eosinophils Absolute 0.1 0.0 - 0.4 thou/uL    Basophils Absolute 0.0 0.0 - 0.2 thou/uL    Myelocytes Absolute 0.3 (H) <=0.1 thou/uL    Platelet Estimate Normal Normal         Assessment:  1. Infected abrasion of second toe, right, initial encounter     2. Chronic myelomonocytic leukemia     3. Dementia     4. DM type 2 (diabetes mellitus, type 2)         Plan: Patient's white count significantly changed from  44,000-12,000.  In addition to this I am quite concerned about his toe this may be gangrenous and/or osteomyelitic.  Because of this we will get him into podiatry a right away.  Would very much consider amputation.  In addition to this we will enrich and his blood sugars which is still not optimal by going from 8 units of Lantus a.m. 68 units p.m. to 16 units a.m. and 68 units p.m.  We will restart him on antibiotics at Keflex 500 mg 4 times daily #40 for 10 days.  Additionally we will continue his Coumadin with an INR 2.8 so that we will recheck it in just 1 week's time because of his usage of antibiotics.  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

## 2021-06-15 NOTE — PROGRESS NOTES
[facility]    [facility] [facility] [facility][id number]   Code Status: DNR/DNI      CHIEF COMPLAINT/REASON FOR VISIT:  Chief Complaint   Patient presents with     Problem Visit     Foot infection     HISTORY:      HPI: Garcia is a 72 y.o. male with multiple medical problems including anemia, CMML, DMII, DVT, agent orange exposure, pneumonia, and UTI. He is currently being evaluated for an ongoing right foot infection. He has been off of Keflex for about one week and he states his foot has again worsened. It is swollen, erythematous, and more painful. He feels like Keflex works well and keeps the infection at bay. He is scheduled for an amputation in the early part of March. He would like to be put back on it. He denies any other concerns including headaches, vision changes, chest pain/pressure, difficulty breathing, SOB, abdominal pain, nausea, vomiting, diarrhea, dysuria, increasing weakness.     Past Medical History:   Diagnosis Date     Anemia 10/14/2015     Bacteremia 10/14/2015     CMML (chronic myelomonocytic leukemia)      Diabetes mellitus     type 2-insulin dependent     Diabetic peripheral neuropathy associated with type 2 diabetes mellitus      DVT (deep venous thrombosis) 10/14/2015    left L/E     Dysphagia 10/14/2015     Encephalopathy 10/14/2015     Foot drop, right 10/14/2015     Hematoma of muscle 11/10/15    Right psoas     History of agent Orange exposure     during [employer] war     History of angina pectoris      Hyperlipidemia      Hypertension      MDS (myelodysplastic syndrome)      Non-ischemic cardiomyopathy      Pneumonia      Sphenoid sinusitis      UTI (urinary tract infection) 10/14/2014    KLEBSIELLA             Family History   Problem Relation Age of Onset     Asthma Sister      Heart disease Brother      Asthma Son      Heart disease Brother      Social History     Social History     Marital status:      Spouse name: N/A     Number of children: N/A      Years of education: N/A     Social History Main Topics     Smoking status: Never Smoker     Smokeless tobacco: Never Used     Alcohol use No     Drug use: No     Sexual activity: Not Currently     Other Topics Concern     None     Social History Narrative       REVIEW OF SYSTEM:  Pertinent items are noted in HPI.    PHYSICAL EXAM:   /68  Pulse 77  Temp 97.6  F (36.4  C)  Resp 18  Wt (!) 240 lb 3.2 oz (109 kg)  SpO2 97%  BMI 34.47 kg/m2  General appearance: alert, appears stated age and cooperative  Head: Normocephalic, without obvious abnormality, atraumatic  Lungs: respirations without effort, LS CTA.   Cardiovascular: S1, S2, no mumur, gallop, or rub  Skin: exposed skin is BWDI  Extremities: right foot skin grossly broken down, right 3rd toe black/nectrotic, odiferous, 2-3+ edema to right foot  Neurologic: Grossly normal      LABS:   None today, INR per previous order.    ASSESSMENT:      ICD-10-CM    1. Diabetic foot infection E11.69     L08.9        PLAN:    Diabetic Foot Infection with pending Amputation  -Patient would like to go back on Keflex as he feels his foot is better this way.   -Keflext 250 mg by mouth twice daily, opted for a lower dose in hopes that it would work as a prophylaxis.  -Monitor foot changes and report to providers.   -Follow up as needed.     Otherwise continue current care plan for all other chronic medical conditions, as they are stable. Encouraged patient to engage in healthy lifestyle behaviors such as engaging in social activities, exercising (PT/OT), eating well, and following their care plan. Follow up in the next week for routine check-up, or sooner if needed. Will continue to monitor patient and work with nursing staff collaboratively to work toward positive patient outcomes.    Greater than 25 minutes spent with patient with at least 55% of this time spent on counseling, education, and discussion of the above care plan with nursing staff, and patient.      Electronically signed by: Julianne Rogers, CHRIS

## 2021-06-16 NOTE — PROGRESS NOTES
[facility]    [facility]   [place] [facility][id number][facility]   Code Status: DNR/DNI      CHIEF COMPLAINT/REASON FOR VISIT:  Chief Complaint   Patient presents with     Problem Visit     Status Change       HISTORY:      HPI: Garcia is a 72 y.o. male with multiple medical problems including anemia, CMML, DMII, DVT, agent orange exposure, pneumonia, and UTI. He is currently being evaluated by request of wife through nursing staff. Apparently wife is afraid he is getting worse and that he isn't doing well. She believes this because he is confused when they are talking on the phone, however nursing staff share that he has remained stable without any significant changes. He is actually looking fairly good, he is alert, pleasant, and conversant. He does not have any medical issues that he would like to discuss. Recent lab work does not reveal any significant changes in previous metabolic or CML issues. He denies any other concerns including fevers/chills, cough or cold symptoms, worsening pain, worsening foot infection, headaches, vision changes, chest pain/pressure, difficulty breathing, SOB, abdominal pain, nausea, vomiting, diarrhea, dysuria, increasing weakness.     Past Medical History:   Diagnosis Date     Anemia 10/14/2015     Bacteremia 10/14/2015     CMML (chronic myelomonocytic leukemia)      Diabetes mellitus     type 2-insulin dependent     Diabetic peripheral neuropathy associated with type 2 diabetes mellitus      DVT (deep venous thrombosis) 10/14/2015    left L/E     Dysphagia 10/14/2015     Encephalopathy 10/14/2015     Foot drop, right 10/14/2015     Hematoma of muscle 11/10/15    Right psoas     History of agent Orange exposure     during [employer] war     History of angina pectoris      Hyperlipidemia      Hypertension      MDS (myelodysplastic syndrome)      Non-ischemic cardiomyopathy      Pneumonia      Sphenoid sinusitis      UTI (urinary tract infection) 10/14/2014    KLEBSIELLA              Family History   Problem Relation Age of Onset     Asthma Sister      Heart disease Brother      Asthma Son      Heart disease Brother      Social History     Social History     Marital status:      Spouse name: N/A     Number of children: N/A     Years of education: N/A     Social History Main Topics     Smoking status: Never Smoker     Smokeless tobacco: Never Used     Alcohol use No     Drug use: No     Sexual activity: Not Currently     Other Topics Concern     None     Social History Narrative       REVIEW OF SYSTEM:  Pertinent items are noted in HPI.    PHYSICAL EXAM:   /64  Pulse 69  Temp 96.8  F (36  C)  Resp 18  SpO2 98%  General appearance: alert, appears stated age and cooperative  Head: Normocephalic, without obvious abnormality, atraumatic  Lungs: respirations without effort, LS CTA but diminished at bases.   Cardiovascular: S1, S2, no murmur, rubs, or gallops noted.  GI: abdomen round, soft, nontender, BS x4  Skin: Skin color, texture, turgor normal. No rashes or lesions  Neurologic: Grossly normal      LABS:   INR in one week.     ASSESSMENT:      ICD-10-CM    1. Confusion R41.0    2. Chronic myelomonocytic leukemia C93.10    3. Diabetic foot infection E11.69     L08.9        PLAN:    Confusion/CML/Diabetic Foot Infection with pending Amputation of foot  -Patient appears to actually doing better than in recent visits- VSS, alert, oriented x2.   -He does not have any concerns at this time, nursing staff state they will continue to monitor.   -Follow up as needed.     Otherwise continue current care plan for all other chronic medical conditions, as they are stable. Encouraged patient to engage in healthy lifestyle behaviors such as engaging in social activities, exercising (PT/OT), eating well, and following their care plan. Follow up in the next week for routine check-up, or sooner if needed. Will continue to monitor patient and work with nursing staff collaboratively to  work toward positive patient outcomes.    Greater than 25 minutes spent with patient with at least 55% of this time spent on counseling, education, and discussion of the above care plan with nursing staff, and patient.     Electronically signed by: Julianne Rogers CNP

## 2021-06-16 NOTE — PROGRESS NOTES
[facility]    Facility:   [facility] [[id number]]   Code Status: DNR/DNI      CHIEF COMPLAINT/REASON FOR VISIT:  Chief Complaint   Patient presents with     Problem Visit     Anticoagulation Management       HISTORY:      HPI: Garcia is a 72 y.o. male with multiple medical problems including anemia, CMML, DMII, DVT, agent orange exposure, pneumonia, and UTI. He is currently being anticoagulated for history of DVT with a goal of 2-3. He had his INR checked today at the facility and it was slightly above goal, 3.1. He denies any uncontrolled bleeding, any active bleeding, any bruising, hematuria, epistaxis, blood in stools or black/dark/tarry stools. No other concerns noted.     Past Medical History:   Diagnosis Date     Anemia 10/14/2015     Bacteremia 10/14/2015     CMML (chronic myelomonocytic leukemia)      Diabetes mellitus     type 2-insulin dependent     Diabetic peripheral neuropathy associated with type 2 diabetes mellitus      DVT (deep venous thrombosis) 10/14/2015    left L/E     Dysphagia 10/14/2015     Encephalopathy 10/14/2015     Foot drop, right 10/14/2015     Hematoma of muscle 11/10/15    Right psoas     History of agent Orange exposure     during [employer] war     History of angina pectoris      Hyperlipidemia      Hypertension      MDS (myelodysplastic syndrome)      Non-ischemic cardiomyopathy      Pneumonia      Sphenoid sinusitis      UTI (urinary tract infection) 10/14/2014    KLEBSIELLA             Family History   Problem Relation Age of Onset     Asthma Sister      Heart disease Brother      Asthma Son      Heart disease Brother      Social History     Social History     Marital status:      Spouse name: N/A     Number of children: N/A     Years of education: N/A     Social History Main Topics     Smoking status: Never Smoker     Smokeless tobacco: Never Used     Alcohol use No     Drug use: No     Sexual activity: Not Currently     Other Topics Concern      None     Social History Narrative       REVIEW OF SYSTEM:  Pertinent items are noted in HPI.    PHYSICAL EXAM:   /71  Pulse 72  Temp 98  F (36.7  C)  Resp 18  Wt (!) 250 lb 9.6 oz (113.7 kg)  SpO2 95%  BMI 35.96 kg/m2  General appearance: alert, appears stated age and cooperative  Head: Normocephalic, without obvious abnormality, atraumatic  Lungs: respirations without effort  Skin: Skin color, texture, turgor normal. No rashes or lesions  Neurologic: Grossly normal      LABS:   INR in one week.     ASSESSMENT:      ICD-10-CM    1. Anticoagulation management encounter Z51.81     Z79.01        PLAN:    Anticoagulation Management  -INR at slightly over goal today 3.1, continue current coumadin dose (5.5 mg by mouth M/W/F/Saturday and 5 mg all other days).   -Monitor for bleeding or any other concerns.   -Recheck INR in one week.     Greater than 15 minutes spent with patient with at least 55% of this time spent on counseling, education, and discussion of the above care plan with nursing staff, and patient.     Electronically signed by: Julianne Rogers CNP

## 2021-06-16 NOTE — PROGRESS NOTES
[facility]    Facility:   [facility] [[id number]]   Code Status: DNR/DNI      CHIEF COMPLAINT/REASON FOR VISIT:  Chief Complaint   Patient presents with     Follow Up     Anticoagulation Management       HISTORY:      HPI: Garcia is a 72 y.o. male with multiple medical problems including anemia, CMML, DMII, DVT, agent orange exposure, pneumonia, and UTI. He is currently being anticoagulated for history of DVT with a goal of 2-3. He had his INR checked today at the facility and it was slightly above goal, 3.1. This is the same INR as last week. He denies any uncontrolled bleeding, any active bleeding, any bruising, hematuria, epistaxis, blood in stools or black/dark/tarry stools. No other concerns noted.     Past Medical History:   Diagnosis Date     Anemia 10/14/2015     Bacteremia 10/14/2015     CMML (chronic myelomonocytic leukemia)      Diabetes mellitus     type 2-insulin dependent     Diabetic peripheral neuropathy associated with type 2 diabetes mellitus      DVT (deep venous thrombosis) 10/14/2015    left L/E     Dysphagia 10/14/2015     Encephalopathy 10/14/2015     Foot drop, right 10/14/2015     Hematoma of muscle 11/10/15    Right psoas     History of agent Orange exposure     during [employer] war     History of angina pectoris      Hyperlipidemia      Hypertension      MDS (myelodysplastic syndrome)      Non-ischemic cardiomyopathy      Pneumonia      Sphenoid sinusitis      UTI (urinary tract infection) 10/14/2014    KLEBSIELLA             Family History   Problem Relation Age of Onset     Asthma Sister      Heart disease Brother      Asthma Son      Heart disease Brother      Social History     Social History     Marital status:      Spouse name: N/A     Number of children: N/A     Years of education: N/A     Social History Main Topics     Smoking status: Never Smoker     Smokeless tobacco: Never Used     Alcohol use No     Drug use: No     Sexual activity: Not  Currently     Other Topics Concern     None     Social History Narrative       REVIEW OF SYSTEM:  Pertinent items are noted in HPI.    PHYSICAL EXAM:   /78  Pulse 72  Temp (!) 96.1  F (35.6  C)  Resp 18  Wt (!) 251 lb 9.6 oz (114.1 kg)  SpO2 96%  BMI 36.1 kg/m2  General appearance: alert, appears stated age and cooperative  Head: Normocephalic, without obvious abnormality, atraumatic  Lungs: respirations without effort  Skin: Skin color, texture, turgor normal. No rashes or lesions  Neurologic: Grossly normal      LABS:   INR in one week.     ASSESSMENT:      ICD-10-CM    1. Anticoagulation management encounter Z51.81     Z79.01        PLAN:    Anticoagulation Management  -INR at slightly over goal again today 3.1, continue current coumadin dose (5.5 mg by mouth M/W/F/Saturday and 5 mg all other days).   -Monitor for bleeding or any other concerns.   -Recheck INR in one week.     Referral Orders  -Orders signed for referral to oncology, vascular surgeon.    Greater than 15 minutes spent with patient with at least 55% of this time spent on counseling, education, and discussion of the above care plan with nursing staff, and patient.     Electronically signed by: Julianne Rogers CNP

## 2021-06-16 NOTE — PROGRESS NOTES
Code Status:  DNR/DNI  Visit Type: H & P     Facility:  [facility] [[id number]]      Facility Type:  (Long Term Care, LTC)    History of Present Illness:   Hospital Admission Date: February 15, 2018 [facility] hospital Discharge Date: February 21, 2018  Facility Admission Date: February 21, 2018    Garcia Lara is a 72 y.o. male who suffers from dementia diabetes obesity and recently has had chronic infection in his right foot.  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  He went to his outpatient appointment because of the leg pain and he was subsequently admitted.  In the hospital they felt that he was deemed a candidate for a right BKA and surgery is being planned for February 28.  They felt though that he would not be deemed for prosthesis as he does not walk and has chronic impaired mobility and poor functional status anyways.    Hospital course; the x-ray of his right foot showed small bubbles of gas projected adjacent to the second toe but no definitive sites of bone destruction or bony demineralization.  They had him on Zosyn/clindamycin and Zyvox.  He also had some mild hyponatremia and they checked his hemoglobin A1c which was 8.2 and this is about what he has been here.    Past Medical History:   Diagnosis Date     Anemia 10/14/2015     Bacteremia 10/14/2015     CMML (chronic myelomonocytic leukemia)      Diabetes mellitus     type 2-insulin dependent     Diabetic peripheral neuropathy associated with type 2 diabetes mellitus      DVT (deep venous thrombosis) 10/14/2015    left L/E     Dysphagia 10/14/2015     Encephalopathy 10/14/2015     Foot drop, right 10/14/2015     Hematoma of muscle 11/10/15    Right psoas     History of agent Orange exposure     during [employer] war     History of angina pectoris      Hyperlipidemia      Hypertension      MDS (myelodysplastic syndrome)      Non-ischemic cardiomyopathy      Pneumonia      Sphenoid  sinusitis      UTI (urinary tract infection) 10/14/2014    KLEBSIELLA     Past Surgical History:   Procedure Laterality Date     BONE MARROW TRANSPLANT  10/14/2015     COLONOSCOPY       GASTROSTOMY TUBE PLACEMENT  9/24/15     OTHER SURGICAL HISTORY  1986    Hc pressure gradint measurement, initial vessel     OTHER SURGICAL HISTORY  11/16/15    IVC filter placement     Family History   Problem Relation Age of Onset     Asthma Sister      Heart disease Brother      Asthma Son      Heart disease Brother      Social History     Social History     Marital status:      Spouse name: N/A     Number of children: N/A     Years of education: N/A     Occupational History     Not on file.     Social History Main Topics     Smoking status: Never Smoker     Smokeless tobacco: Never Used     Alcohol use No     Drug use: No     Sexual activity: Not Currently     Other Topics Concern     Not on file     Social History Narrative     Additional Geriatric Review patient is a long-standing resident of our facility has been demented and dependent in all cares for some time, but he is very pleasant  Current Outpatient Prescriptions   Medication Sig Dispense Refill     acyclovir (ZOVIRAX) 200 mg/5 mL suspension Take 800 mg by mouth 5 (five) times a day.       AMLODIPINE 1 MG/ML SUSP (NORVASC) Take 10 mg by mouth daily.       dextrose (DEXTROSE) 40 % gel Take 15-30 g by mouth every 15 (fifteen) minutes as needed (for low blood sugar).        enoxaparin (LOVENOX) 40 mg/0.4 mL syringe Inject 40 mg under the skin daily.       glucagon, human recombinant, 1 mg/mL SolR injection Inject 1 mg into the shoulder, thigh, or buttocks as needed (inject every 15 min for low blood sugar (may repeat x1)). hypoglycemia       guar gum (NUTRISOURCE FIBER) Pack powder packet 2 packets by G-tube route 2 (two) times a day.       insulin aspart (NOVOLOG) 100 unit/mL injection Inject under the skin 3 (three) times a day before meals. Per sliding  scale  140-164=1 units  165-189=2 units  190-214=3 units  315-239=4 units  240-264=5 units  265-289=6 units  290-314=7 units  315-339=8 units  340-364=9 units  = or >365 10 units       insulin aspart (NOVOLOG) 100 unit/mL injection Inject under the skin. Every 4 hours during tube feed  140-149=1u, 150-159=2u, 160-169=3u, 170-179=4u, 180-189=5u, 190-199=6u, 200-209=7u, 210-219=8u, 220-229=9u, 230-239=10u, 240-249=11u, 250-259=12u, 260-269=13u, 270-279=14u, 280-289=15u, 290-299=16u, 300-209=17u, 310-319=18u, 320-329=19u, 330-339=20u, 340-349-21u, >350=22u       insulin NPH (HUMULIN N) 100 unit/mL injection Inject 15 Units under the skin every morning.        insulin NPH (HUMULIN N) 100 unit/mL injection Inject 60 Units under the skin every evening. DO NOT GIVE IF TUBE FEEDS ARE HELD       loperamide (IMODIUM) 1 mg/5 mL solution Take 2 mg by mouth 2 (two) times a day.       METOPROLOL TARTRATE (LOPRESSOR ORAL) Take 50 mg by mouth 2 (two) times a day. 10MG/ML solution, give 50mg oral or feeding tube       PANTOPRAZOLE SODIUM (PROTONIX ORAL) Take 40 mg by mouth daily. 2mg/mL solution, give 40mg       sirolimus (RAPAMUNE) 1 mg/mL solution Take 0.5 mg by mouth daily.       sulfamethoxazole-trimethoprim (SEPTRA) 200-40 mg/5 mL suspension 160 mg by G-tube route. On Mon, Tues two times daily, dose based on TMP component       UNABLE TO FIND Take 3 mg by mouth 3 (three) times a day. Med Name: Budesonide (ENTOCORT) 0.6mg/mL. Take 5mLs (3mg)       voriconazole (VFEND) 200 mg/5 mL (40 mg/mL) suspension Take 200 mg by mouth every 12 (twelve) hours.       No current facility-administered medications for this visit.      Allergies   Allergen Reactions     Ace Inhibitors Angiodema     Vancomycin Swelling     Patient developed lip swelling ~1-2 days after vancomycin was started at [place], repeated with test dose, lips swelled.        There is no immunization history on file for this patient.      Review of Systems   Unable to  perform ROS: Dementia        Physical Exam   Constitutional: He is oriented to person, place, and time. He appears well-developed and well-nourished.   HENT:   Head: Normocephalic and atraumatic.   Right Ear: External ear normal.   Left Ear: External ear normal.   Nose: Nose normal.   Mouth/Throat: Oropharynx is clear and moist.   Eyes: Conjunctivae and EOM are normal. Pupils are equal, round, and reactive to light. Right eye exhibits no discharge. Left eye exhibits no discharge.   Neck: Neck supple. No JVD present. No tracheal deviation present. No thyromegaly present.   Cardiovascular: Normal rate, regular rhythm, normal heart sounds and intact distal pulses.    No murmur heard.  Pulmonary/Chest: Effort normal and breath sounds normal. No respiratory distress. He has no wheezes. He has no rales. He exhibits no tenderness.   Abdominal: He exhibits no distension and no mass. There is no tenderness. There is no guarding.   Musculoskeletal: Normal range of motion. He exhibits no edema or tenderness.   Careful attention to the patient's right lower extremity reveals significant scaly thickening and brownish discoloration of the anterior tibial region and thickened skin and as one goes to the dorsum of the foot this gets even thicker and then the right second toe is completely black.  It appears dry   Lymphadenopathy:     He has no cervical adenopathy.   Neurological: He is alert and oriented to person, place, and time. He has normal reflexes. No cranial nerve deficit. Coordination normal.   Patient dependent in all cares often needs assistance to propel in his wheelchair.  Is a Stephen lift   Skin: Skin is warm and dry. No rash noted. No erythema.   Psychiatric: He has a normal mood and affect. His behavior is normal.   Patient is in good spirits but globally confused and will respond but will not initiate conversation.   Nursing note and vitals reviewed.        Labs:  All labs reviewed in the nursing home  record.    Assessment:  1. Gangrene of toe of right foot     2. Diabetic foot infection     3. Diabetes mellitus with neuropathy     4. Chronic myelomonocytic leukemia     5. Dementia     6. Anticoagulation management encounter         Plan: At this juncture we will anticipate surgery on February 28 we will continue with the modifications written on his discharge notes and get him into physical therapy there will be no other additional orders at this time, as he appears at his baseline prior to hospital admission    Total 35 minutes of which 65% was spent in counseling and coordination of care of the above plan.    Electronically signed by: Fred Odell MD

## 2021-06-17 NOTE — PROGRESS NOTES
[facility]    Facility[facility] [[id number][facility]   Code Status: DNR/DNI      CHIEF COMPLAINT/REASON FOR VISIT:  Chief Complaint   Patient presents with     Follow Up     Anticoagulation Management       HISTORY:      HPI: Garcia is a 72 y.o. male with multiple medical problems including anemia, CMML, DMII, DVT, agent orange exposure, pneumonia, and UTI. He is currently being anticoagulated for history of DVT with a goal of 2-3. He had his INR checked today at the facility and it was 2.0. He denies any uncontrolled bleeding, any active bleeding, any bruising, hematuria, epistaxis, blood in stools or black/dark/tarry stools. No other concerns noted.     Past Medical History:   Diagnosis Date     Anemia 10/14/2015     Bacteremia 10/14/2015     CMML (chronic myelomonocytic leukemia)      Diabetes mellitus     type 2-insulin dependent     Diabetic peripheral neuropathy associated with type 2 diabetes mellitus      DVT (deep venous thrombosis) 10/14/2015    left L/E     Dysphagia 10/14/2015     Encephalopathy 10/14/2015     Foot drop, right 10/14/2015     Hematoma of muscle 11/10/15    Right psoas     History of agent Orange exposure     during [employer]     History of angina pectoris      Hyperlipidemia      Hypertension      MDS (myelodysplastic syndrome)      Non-ischemic cardiomyopathy      Pneumonia      Sphenoid sinusitis      UTI (urinary tract infection) 10/14/2014    KLEBSIELLA             Family History   Problem Relation Age of Onset     Asthma Sister      Heart disease Brother      Asthma Son      Heart disease Brother      Social History     Social History     Marital status:      Spouse name: N/A     Number of children: N/A     Years of education: N/A     Social History Main Topics     Smoking status: Never Smoker     Smokeless tobacco: Never Used     Alcohol use No     Drug use: No     Sexual activity: Not Currently     Other Topics Concern     None     Social  History Narrative       REVIEW OF SYSTEM:  Pertinent items are noted in HPI.    PHYSICAL EXAM:   /70  Pulse 70  Temp 97.5  F (36.4  C)  Resp 18  Wt (!) 248 lb 3.2 oz (112.6 kg)  SpO2 96%  BMI 35.61 kg/m2  General appearance: alert, appears stated age and cooperative  Head: Normocephalic, without obvious abnormality, atraumatic  Lungs: respirations without effort  Skin: Skin color, texture, turgor normal. No rashes or lesions  Neurologic: Grossly normal      LABS:   INR in one week.     ASSESSMENT:      ICD-10-CM    1. Anticoagulation management encounter Z51.81     Z79.01        PLAN:    Anticoagulation Management  -INR today is at goal, 2.0. Continue current dose 5.5 mg by mouth daily.  -Monitor for bleeding or any other concerns.   -Recheck INR in one week.     Greater than 15 minutes spent with patient with at least 55% of this time spent on counseling, education, and discussion of the above care plan with nursing staff, and patient.     Electronically signed by: Julianne Rogers CNP

## 2021-06-17 NOTE — PROGRESS NOTES
[facility]    Facility:   [facility] [[id number]]   Code Status: DNR/DNI      CHIEF COMPLAINT/REASON FOR VISIT:  Chief Complaint   Patient presents with     Problem Visit     Pain        HISTORY:      HPI: Garcia is a 72 y.o. male with multiple medical problems including anemia, CMML, DMII, DVT, agent orange exposure, pneumonia, and UTI. Today nursing staff is requesting evaluation of pain. The patient states he has not had any pain and that the pain is well controlled. An evaluation of the MAR, shows that he is very rarely using PRN oxycodone. Nursing staff state they feel like he may need something more. Patient's wife is worried about sedation and does not want him on any more medication because he becomes sedated, disoriented, and has issues such as falls. The patient denies any concerns and doesn't have any medical issues he would like to discuss. He denies any other concerns including fevers/chills, cough or cold symptoms, headaches, vision changes, chest pain/pressure, difficulty breathing, SOB, abdominal pain, nausea, vomiting, diarrhea, dysuria, increasing weakness, increasing pain.       Past Medical History:   Diagnosis Date     Anemia 10/14/2015     Bacteremia 10/14/2015     CMML (chronic myelomonocytic leukemia)      Diabetes mellitus     type 2-insulin dependent     Diabetic peripheral neuropathy associated with type 2 diabetes mellitus      DVT (deep venous thrombosis) 10/14/2015    left L/E     Dysphagia 10/14/2015     Encephalopathy 10/14/2015     Foot drop, right 10/14/2015     Hematoma of muscle 11/10/15    Right psoas     History of agent Orange exposure     during [employer] war     History of angina pectoris      Hyperlipidemia      Hypertension      MDS (myelodysplastic syndrome)      Non-ischemic cardiomyopathy      Pneumonia      Sphenoid sinusitis      UTI (urinary tract infection) 10/14/2014    KLEBSIELLA             Family History   Problem Relation Age of  Onset     Asthma Sister      Heart disease Brother      Asthma Son      Heart disease Brother      Social History     Social History     Marital status:      Spouse name: N/A     Number of children: N/A     Years of education: N/A     Social History Main Topics     Smoking status: Never Smoker     Smokeless tobacco: Never Used     Alcohol use No     Drug use: No     Sexual activity: Not Currently     Other Topics Concern     None     Social History Narrative       REVIEW OF SYSTEM:  Pertinent items are noted in HPI.    PHYSICAL EXAM:   /65  Pulse 73  Temp (!) 96.1  F (35.6  C)  Resp 18  Wt (!) 248 lb 3.2 oz (112.6 kg)  SpO2 90%  BMI 35.61 kg/m2  General appearance: alert, appears stated age and cooperative  Head: Normocephalic, without obvious abnormality, atraumatic  Lungs: respirations without effort  Skin: Skin color, texture, turgor normal. No rashes or lesions, except for on feet. Right foot, all toes with infection/necrosis to some degree, 2nd right toe completely dead and drying out.   Neurologic: Grossly normal      LABS:   INR in one week.     ASSESSMENT:      ICD-10-CM    1. Diabetic foot infection E11.69     L08.9        PLAN:    Pain  -Continue current pain regimen, utilizing PRN pain medications as needed.   -Continue to work with [facility] and [facility] to ensure pain medication and patient's needs in general are being met.   -[facility] for [facility].   -Will continue to monitor.   -Follow up as needed.     Otherwise continue current care plan for all other chronic medical conditions, as they are stable. Encouraged patient to engage in healthy lifestyle behaviors such as engaging in social activities, exercising (PT/OT), eating well, and following their care plan. Follow up in the next week for routine check-up, or sooner if needed. Will continue to monitor patient and work with nursing staff collaboratively to work toward positive patient outcomes.    Greater than  25 minutes spent with patient with at least 55% of this time spent on counseling, education, and discussion of the above care plan with nursing staff, and patient.     Electronically signed by: Julianne Rogers CNP

## 2021-06-17 NOTE — PROGRESS NOTES
[facility]    [facility] [[id number][facility]   Code Status: DNR/DNI      CHIEF COMPLAINT/REASON FOR VISIT:  Chief Complaint   Patient presents with     Follow Up     Anticoagulation Management       HISTORY:      HPI: Garcia is a 72 y.o. male with multiple medical problems including anemia, CMML, DMII, DVT, agent orange exposure, pneumonia, and UTI. He is currently being anticoagulated for history of DVT with a goal of 2-3. He had his INR checked today at the facility and it was 1.7, subtherapeutic. He denies any uncontrolled bleeding, any active bleeding, any bruising, hematuria, epistaxis, blood in stools or black/dark/tarry stools. No other concerns noted.     Past Medical History:   Diagnosis Date     Anemia 10/14/2015     Bacteremia 10/14/2015     CMML (chronic myelomonocytic leukemia)      Diabetes mellitus     type 2-insulin dependent     Diabetic peripheral neuropathy associated with type 2 diabetes mellitus      DVT (deep venous thrombosis) 10/14/2015    left L/E     Dysphagia 10/14/2015     Encephalopathy 10/14/2015     Foot drop, right 10/14/2015     Hematoma of muscle 11/10/15    Right psoas     History of agent Orange exposure     during [place] war     History of angina pectoris      Hyperlipidemia      Hypertension      MDS (myelodysplastic syndrome)      Non-ischemic cardiomyopathy      Pneumonia      Sphenoid sinusitis      UTI (urinary tract infection) 10/14/2014    KLEBSIELLA             Family History   Problem Relation Age of Onset     Asthma Sister      Heart disease Brother      Asthma Son      Heart disease Brother      Social History     Social History     Marital status:      Spouse name: N/A     Number of children: N/A     Years of education: N/A     Social History Main Topics     Smoking status: Never Smoker     Smokeless tobacco: Never Used     Alcohol use No     Drug use: No     Sexual activity: Not Currently     Other Topics Concern     None      Social History Narrative       REVIEW OF SYSTEM:  Pertinent items are noted in HPI.    PHYSICAL EXAM:   /71  Pulse 71  Temp 97.8  F (36.6  C)  Resp 20  Wt (!) 248 lb 12.8 oz (112.9 kg)  SpO2 97%  BMI 35.7 kg/m2  General appearance: alert, appears stated age and cooperative  Head: Normocephalic, without obvious abnormality, atraumatic  Lungs: respirations without effort  Skin: Skin color, texture, turgor normal. No rashes or lesions  Neurologic: Grossly normal      LABS:   INR in one week.     ASSESSMENT:      ICD-10-CM    1. Anticoagulation management encounter Z51.81     Z79.01        PLAN:    Anticoagulation Management  -INR today is 1.7, with increase dose to 5.5 mg by mouth daily.  -Monitor for bleeding or any other concerns.   -Recheck INR in one week.     Greater than 15 minutes spent with patient with at least 55% of this time spent on counseling, education, and discussion of the above care plan with nursing staff, and patient.     Electronically signed by: Julianne Rogers CNP

## 2021-06-17 NOTE — PROGRESS NOTES
[facility]    [facility]   [place] [facility] [[id number][facility]   Code Status: DNR/DNI      CHIEF COMPLAINT/REASON FOR VISIT:  Chief Complaint   Patient presents with     Follow Up     Anticoagulation Management       HISTORY:      HPI: Garcia is a 72 y.o. male with multiple medical problems including anemia, CMML, DMII, DVT, agent orange exposure, pneumonia, and UTI. He is currently being anticoagulated for history of DVT with a goal of 2-3. He had his INR checked today at the facility and it was 2.2. He denies any uncontrolled bleeding, any active bleeding, any bruising, hematuria, epistaxis, blood in stools or black/dark/tarry stools. No other concerns noted.     Past Medical History:   Diagnosis Date     Anemia 10/14/2015     Bacteremia 10/14/2015     CMML (chronic myelomonocytic leukemia)      Diabetes mellitus     type 2-insulin dependent     Diabetic peripheral neuropathy associated with type 2 diabetes mellitus      DVT (deep venous thrombosis) 10/14/2015    left L/E     Dysphagia 10/14/2015     Encephalopathy 10/14/2015     Foot drop, right 10/14/2015     Hematoma of muscle 11/10/15    Right psoas     History of agent Orange exposure     during [employer]     History of angina pectoris      Hyperlipidemia      Hypertension      MDS (myelodysplastic syndrome)      Non-ischemic cardiomyopathy      Pneumonia      Sphenoid sinusitis      UTI (urinary tract infection) 10/14/2014    KLEBSIELLA             Family History   Problem Relation Age of Onset     Asthma Sister      Heart disease Brother      Asthma Son      Heart disease Brother      Social History     Social History     Marital status:      Spouse name: N/A     Number of children: N/A     Years of education: N/A     Social History Main Topics     Smoking status: Never Smoker     Smokeless tobacco: Never Used     Alcohol use No     Drug use: No     Sexual activity: Not Currently     Other Topics Concern     Not on file      Social History Narrative       REVIEW OF SYSTEM:  Pertinent items are noted in HPI.    PHYSICAL EXAM:   /74  Pulse 70  Temp 97.8  F (36.6  C)  Resp 18  Wt (!) 248 lb 9.6 oz (112.8 kg)  SpO2 96%  BMI 35.67 kg/m2  General appearance: alert, appears stated age and cooperative  Head: Normocephalic, without obvious abnormality, atraumatic  Lungs: respirations without effort  Skin: Skin color, texture, turgor normal. No rashes or lesions  Neurologic: Grossly normal      LABS:   INR in one week.     ASSESSMENT:      ICD-10-CM    1. Anticoagulation management encounter Z51.81     Z79.01        PLAN:    Anticoagulation Management  -INR today is at goal, 2.2. Continue current dose 5.5 mg by mouth daily.  -Monitor for bleeding or any other concerns.   -Recheck INR in one week.     Greater than 15 minutes spent with patient with at least 55% of this time spent on counseling, education, and discussion of the above care plan with nursing staff, and patient.     Electronically signed by: Julianne Rogers CNP

## 2021-06-17 NOTE — PROGRESS NOTES
[facility]    [facility]   [place] [facility][id number][facility]   Code Status: DNR/DNI      CHIEF COMPLAINT/REASON FOR VISIT:  Chief Complaint   Patient presents with     Problem Visit     antibiotic management       HISTORY:      HPI: Garcia is a 72 y.o. male with multiple medical problems including anemia, CMML, DMII, DVT, agent orange exposure, pneumonia, and UTI. Today he is being evaluated for right foot infection, he has had this ongoing infection/necrosis for months now. He has been in the process of getting an amputation of that foot, however it has been held for various reasons. He was supposed to have the procedure this week, but it has been canceled again. Nursing staff state they are unsure why. They do need to have an order for the antibiotics that he has been on. They are unsure if he is supposed to continue taking them or if he should quit. He does have significant necrosis of the toes and foot. He otherwise is stable, with no other complaints.    Past Medical History:   Diagnosis Date     Anemia 10/14/2015     Bacteremia 10/14/2015     CMML (chronic myelomonocytic leukemia)      Diabetes mellitus     type 2-insulin dependent     Diabetic peripheral neuropathy associated with type 2 diabetes mellitus      DVT (deep venous thrombosis) 10/14/2015    left L/E     Dysphagia 10/14/2015     Encephalopathy 10/14/2015     Foot drop, right 10/14/2015     Hematoma of muscle 11/10/15    Right psoas     History of agent Orange exposure     during [employer] war     History of angina pectoris      Hyperlipidemia      Hypertension      MDS (myelodysplastic syndrome)      Non-ischemic cardiomyopathy      Pneumonia      Sphenoid sinusitis      UTI (urinary tract infection) 10/14/2014    KLEBSIELLA             Family History   Problem Relation Age of Onset     Asthma Sister      Heart disease Brother      Asthma Son      Heart disease Brother      Social History     Social History     Marital status:       Spouse name: N/A     Number of children: N/A     Years of education: N/A     Social History Main Topics     Smoking status: Never Smoker     Smokeless tobacco: Never Used     Alcohol use No     Drug use: No     Sexual activity: Not Currently     Other Topics Concern     None     Social History Narrative       REVIEW OF SYSTEM:  Pertinent items are noted in HPI.    PHYSICAL EXAM:   /71  Pulse 71  Temp 97.8  F (36.6  C)  Resp 20  Wt (!) 249 lb 12.8 oz (113.3 kg)  SpO2 97%  BMI 35.84 kg/m2  General appearance: alert, appears stated age and cooperative  Head: Normocephalic, without obvious abnormality, atraumatic  Lungs: respirations without effort  Skin: Skin color, texture, turgor normal. Necrotic right foot.       LABS:   None today.     ASSESSMENT:      ICD-10-CM    1. Diabetic foot infection E11.69     L08.9        PLAN:    Diabetic Foot Infection-Antibiotic Management  -Continue antibiotics until discussed with vascular surgeon- orders left with nursing staff.   -Keep foot clean and dry.   -Follow up as needed.     Greater that 15 minutes spent with patient with at least 55% of this time spent on counseling, education, and discussion of the above care plan with nursing staff, and patient.     Electronically signed by: Julianne Rogers CNP

## 2021-06-17 NOTE — PROGRESS NOTES
Code Status:  DNR/DNI  Visit Type: H & P (Ischemic right foot)     Facility:  [facility] [[id number]]      Facility Type: NF (Long Term Care, LTC)    History of Present Illness:   Hospital Admission Date: April 20, 2018 [facility] hospital Discharge Date: May 2, 2018  Facility Admission Date: May 2, 2018 [facility] transitional care unit    Garcia Lara is a 72 y.o. male who is had a very gangrenous appearing second toe for some time and was developing a fever and because of that we sent him in for more emergent evaluation.    Hospital course x-ray of the foot as we had feared showed evidence of osteomyelitis.  He was started on IV antibiotics and orthopedics and ID were consulted and he underwent a right trans-metatarsal amputation on April 25.  Cultures were positive for VRE, Pseudomonas and E. coli.  The patient had adequate wound healing with no evidence of complication.  He was transitioned to oral antibiotics on May 1 with the plan to finish an additional 14 day course 2 and May 9.  He initially will be given Cipro and then as a light.  They are going to recommend a weekly CBC to evaluate for thrombocytopenia.  They will do dressing changes only on May 9 after evaluation.  He had been on Ceftin as from April 23-24 Zosyn from April 20 2 April 30 and doxycycline April 20-21.  He has underlying leukocytosis secondary to his blood malignancy and his white count did rise to 50,000 with a negative C. difficile.  It did to trend down and was 32,000 on May 1.  They recommended following.  Brenna O felt that his midfoot amputation was well approximated without erythema or drainage.  He returned to us on his usual medicines.  These include Lipitor/Wellbutrin/calcium/vitamin D MiraLAX senna S they recommended 1 change only after 10 days in the bandage.    Past Medical History:   Diagnosis Date     Anemia 10/14/2015     Bacteremia 10/14/2015     CMML (chronic myelomonocytic leukemia)       Diabetes mellitus     type 2-insulin dependent     Diabetic peripheral neuropathy associated with type 2 diabetes mellitus      DVT (deep venous thrombosis) 10/14/2015    left L/E     Dysphagia 10/14/2015     Encephalopathy 10/14/2015     Foot drop, right 10/14/2015     Hematoma of muscle 11/10/15    Right psoas     History of agent Orange exposure     during [employer] war     History of angina pectoris      Hyperlipidemia      Hypertension      MDS (myelodysplastic syndrome)      Non-ischemic cardiomyopathy      Pneumonia      Sphenoid sinusitis      UTI (urinary tract infection) 10/14/2014    KLEBSIELLA     Past Surgical History:   Procedure Laterality Date     BONE MARROW TRANSPLANT  10/14/2015     COLONOSCOPY       GASTROSTOMY TUBE PLACEMENT  9/24/15     OTHER SURGICAL HISTORY  1986     pressure gradint measurement, initial vessel     OTHER SURGICAL HISTORY  11/16/15    IVC filter placement     Family History   Problem Relation Age of Onset     Asthma Sister      Heart disease Brother      Asthma Son      Heart disease Brother      Social History     Social History     Marital status:      Spouse name: N/A     Number of children: N/A     Years of education: N/A     Occupational History     Not on file.     Social History Main Topics     Smoking status: Never Smoker     Smokeless tobacco: Never Used     Alcohol use No     Drug use: No     Sexual activity: Not Currently     Other Topics Concern     Not on file     Social History Narrative     Additional Geriatric Review patient is long-standing resident but often is keen on going home.  He can self propel in a wheelchair but otherwise his dementia has limited almost all of his activities of independence  Current Outpatient Prescriptions   Medication Sig Dispense Refill     acyclovir (ZOVIRAX) 200 mg/5 mL suspension Take 800 mg by mouth 5 (five) times a day.       AMLODIPINE 1 MG/ML SUSP (NORVASC) Take 10 mg by mouth daily.       dextrose (DEXTROSE) 40 % gel  Take 15-30 g by mouth every 15 (fifteen) minutes as needed (for low blood sugar).        enoxaparin (LOVENOX) 40 mg/0.4 mL syringe Inject 40 mg under the skin daily.       glucagon, human recombinant, 1 mg/mL SolR injection Inject 1 mg into the shoulder, thigh, or buttocks as needed (inject every 15 min for low blood sugar (may repeat x1)). hypoglycemia       guar gum (NUTRISOURCE FIBER) Pack powder packet 2 packets by G-tube route 2 (two) times a day.       insulin aspart (NOVOLOG) 100 unit/mL injection Inject under the skin 3 (three) times a day before meals. Per sliding scale  140-164=1 units  165-189=2 units  190-214=3 units  315-239=4 units  240-264=5 units  265-289=6 units  290-314=7 units  315-339=8 units  340-364=9 units  = or >365 10 units       insulin aspart (NOVOLOG) 100 unit/mL injection Inject under the skin. Every 4 hours during tube feed  140-149=1u, 150-159=2u, 160-169=3u, 170-179=4u, 180-189=5u, 190-199=6u, 200-209=7u, 210-219=8u, 220-229=9u, 230-239=10u, 240-249=11u, 250-259=12u, 260-269=13u, 270-279=14u, 280-289=15u, 290-299=16u, 300-209=17u, 310-319=18u, 320-329=19u, 330-339=20u, 340-349-21u, >350=22u       insulin NPH (HUMULIN N) 100 unit/mL injection Inject 15 Units under the skin every morning.        insulin NPH (HUMULIN N) 100 unit/mL injection Inject 60 Units under the skin every evening. DO NOT GIVE IF TUBE FEEDS ARE HELD       loperamide (IMODIUM) 1 mg/5 mL solution Take 2 mg by mouth 2 (two) times a day.       METOPROLOL TARTRATE (LOPRESSOR ORAL) Take 50 mg by mouth 2 (two) times a day. 10MG/ML solution, give 50mg oral or feeding tube       PANTOPRAZOLE SODIUM (PROTONIX ORAL) Take 40 mg by mouth daily. 2mg/mL solution, give 40mg       sirolimus (RAPAMUNE) 1 mg/mL solution Take 0.5 mg by mouth daily.       sulfamethoxazole-trimethoprim (SEPTRA) 200-40 mg/5 mL suspension 160 mg by G-tube route. On Mon, Tues two times daily, dose based on TMP component       UNABLE TO FIND Take 3 mg by  mouth 3 (three) times a day. Med Name: Budesonide (ENTOCORT) 0.6mg/mL. Take 5mLs (3mg)       voriconazole (VFEND) 200 mg/5 mL (40 mg/mL) suspension Take 200 mg by mouth every 12 (twelve) hours.       WARFARIN SODIUM (WARFARIN ORAL) Take by mouth daily. 3/30/18 INR 1.9  Cont 5mg alt with 5.5mg.  Next INR 4/2/18.  (currently on Augmentin)  3/28/17 INR 1.7. Take 5mg alt with 5.5mg. Next INR 3/30.  Adjust dose based on INR results as directed.       No current facility-administered medications for this visit.      Allergies   Allergen Reactions     Ace Inhibitors Angiodema     Vancomycin Swelling     Patient developed lip swelling ~1-2 days after vancomycin was started at [place], repeated with test dose, lips swelled.        There is no immunization history on file for this patient.      Review of Systems   Unable to perform ROS: Dementia   Constitutional: Negative.  Negative for activity change, chills, diaphoresis, fatigue and fever.   HENT: Negative.  Negative for ear pain, hearing loss, sinus pressure, sore throat and trouble swallowing.    Eyes: Negative.  Negative for discharge and visual disturbance.   Respiratory: Negative.  Negative for cough, shortness of breath and wheezing.    Cardiovascular: Negative.  Negative for chest pain, palpitations and leg swelling.   Gastrointestinal: Negative.  Negative for abdominal pain, constipation and diarrhea.   Endocrine: Negative.  Negative for cold intolerance and heat intolerance.   Genitourinary: Negative.  Negative for difficulty urinating, dysuria, flank pain, frequency and urgency.   Musculoskeletal: Negative.  Negative for arthralgias, back pain, myalgias and neck stiffness.   Skin: Negative.    Allergic/Immunologic: Negative for immunocompromised state.   Neurological: Negative.  Negative for dizziness, tremors, syncope, speech difficulty, weakness, light-headedness, numbness and headaches.   Hematological: Negative.    Psychiatric/Behavioral: Negative.          Physical Exam   Constitutional: He is oriented to person, place, and time. He appears well-developed and well-nourished.   HENT:   Head: Normocephalic and atraumatic.   Right Ear: External ear normal.   Left Ear: External ear normal.   Nose: Nose normal.   Mouth/Throat: Oropharynx is clear and moist.   Eyes: Conjunctivae and EOM are normal. Pupils are equal, round, and reactive to light. Right eye exhibits no discharge. Left eye exhibits no discharge.   Neck: Neck supple. No JVD present. No tracheal deviation present. No thyromegaly present.   Cardiovascular: Normal rate, regular rhythm, normal heart sounds and intact distal pulses.    No murmur heard.  Pulmonary/Chest: Effort normal and breath sounds normal. No respiratory distress. He has no wheezes. He has no rales. He exhibits no tenderness.   Abdominal: He exhibits no distension and no mass. There is no tenderness. There is no guarding.   Musculoskeletal: Normal range of motion. He exhibits no edema or tenderness.   Lower extremities bilaterally and protective boots.  However skin is warm and not able to look at the incision but there is no evidence of breakthrough bleeding on that site and is not significantly tender at the metatarsal temp site to pain or palpation.   Lymphadenopathy:     He has no cervical adenopathy.   Neurological: He is alert and oriented to person, place, and time. He has normal reflexes. No cranial nerve deficit. Coordination normal.   Skin: Skin is warm and dry. No rash noted. No erythema.   Psychiatric: He has a normal mood and affect. His behavior is normal.   Appears in good spirits more alert than I seen him before.  Although he did request to go home.   Nursing note and vitals reviewed.        Labs:  All labs reviewed in the nursing home record.    Assessment:  1. Gangrene of toe of right foot     2. Foot osteomyelitis, right     3. Dementia     4. Hepatitis B     5. Hypertension     6. Hyperlipidemia         Plan: We will keep  an eye on the drainage and make sure that he is getting ice adequately will continue with present medicines including the Lantus 30 every morning 40 every afternoon the sliding scale that is unchanged from prior visitation, alpha lipoic acid, Cipro ×7 days with a stop date of approximately 7 May.  Docusate Fiber-Lax.  The Zyvox will also be completed in 7 days by May 9.  Magnesium oxide 1 twice daily for 7 days as well while he is doing this.    Total 45 minutes of which 65% was spent in counseling and coordination of care of the above plan.    Electronically signed by: Fred Odell MD

## 2021-06-18 NOTE — PROGRESS NOTES
[facility]    [facility] [[id number][facility]   Code Status: DNR/DNI      CHIEF COMPLAINT/REASON FOR VISIT:  Chief Complaint   Patient presents with     Problem Visit       HISTORY:      HPI: Garcia is a 72 y.o. male with multiple medical problems including anemia, CMML, DMII, DVT, agent orange exposure, pneumonia, and UTI. He is currently being anticoagulated for history of DVT with a goal of 2-3. He denies any uncontrolled bleeding, any active bleeding, any bruising, hematuria, epistaxis, blood in stools or black/dark/tarry stools. [facility]P's 88/52 and 92/52 when he was given increased fluids due to heat. I am here reviewing his HTN medication regime as well as B/P readings at the facility . He has consistent B/P at [facility] while here. Met with wife today as well as pt to discuss changes in POC.    Past Medical History:   Diagnosis Date     Acute-on-chronic kidney injury      Anemia 10/14/2015     Bacteremia 10/14/2015     CMML (chronic myelomonocytic leukemia)      Diabetes mellitus     type 2-insulin dependent     Diabetic peripheral neuropathy associated with type 2 diabetes mellitus      Dry gangrene     right second toe     DVT (deep venous thrombosis) 10/14/2015    left L/E     Dysphagia 10/14/2015     Encephalopathy 10/14/2015     Foot drop, right 10/14/2015     Hematoma of muscle 11/10/15    Right psoas     History of agent Orange exposure     during [employer] war     History of angina pectoris      Hyperlipidemia      Hypertension      MDS (myelodysplastic syndrome)      Non-ischemic cardiomyopathy      Osteomyelitis of right foot      Pneumonia      Sphenoid sinusitis      UTI (urinary tract infection) 10/14/2014    KLEBSIELLA             Family History   Problem Relation Age of Onset     Asthma Sister      Heart disease Brother      Asthma Son      Heart disease Brother      Social History     Social History     Marital  status:      Spouse name: N/A     Number of children: N/A     Years of education: N/A     Social History Main Topics     Smoking status: Never Smoker     Smokeless tobacco: Never Used     Alcohol use No     Drug use: No     Sexual activity: Not Currently     Other Topics Concern     Not on file     Social History Narrative       REVIEW OF SYSTEM:  Pertinent items are noted in HPI.    PHYSICAL EXAM:   /73  Pulse 90  Wt (!) 234 lb 6.4 oz (106.3 kg)  BMI 33.63 kg/m2  General appearance: alert, appears stated age and cooperative  Head: Normocephalic, without obvious abnormality, atraumatic  Lungs: respirations without effort  Cardiac: S1, S2, RRR  Abdomen: soft, non tender, BS +4  Skin: Skin color, texture, turgor normal. No rashes or lesions, FEET IN SPECIALIZED BOOTS, UNABLE TO ASSESS  Neurologic: Grossly normal      LABS:   INR in one week.     ASSESSMENT:      ICD-10-CM    1. DM type 2 (diabetes mellitus, type 2) E11.9    2. HTN (hypertension) I10    3. Hypotension I95.9        PLAN:    Pt, with HTN background but has had hypotension, I will decrease his metoporol to 25 mg po two times a day and request facility monitor B/P P two times a day.    DM: doing well with current regime, BS are consistently under 200, continue with current POC.    Greater than 35 minutes spent with patient and spouse with at least 55% of this time spent on counseling, education, and discussion of the above care plan with nursing staff, spouse and patient.     Electronically signed by: Joanne Chen CNP

## 2021-06-18 NOTE — PROGRESS NOTES
Code Status:  FULL CODE  Visit Type: Problem Visit     Facility:  [facility] [[id number]]        Facility Type:  (Long Term Care, LTC)    History of Present Illness: Garcia Lara is a 72 y.o. male with a past medical history of CMML, IDDM, recent hospitalization for osteomyelitis of the right foot s/p right transmetatarsal amputation, anemia of chronic disease, and chronic anticoagulation on warfarin for previous DVT who is seen today for low hemoglobin. Patient's last hemoglobin on 5/8/18 was 8.0 but it has dropped to 6.7 today.     Patient denies any symptoms or recent blood loss. He did require 2 units PRBC after his recent surgery. He requires irradiated blood due to his history of malignancy. [facility]     Review of Systems   Constitutional: Negative.  Negative for chills and fever.   HENT: Negative for nosebleeds.    Respiratory: Negative for cough and shortness of breath.    Cardiovascular: Negative for chest pain, palpitations and leg swelling.   Gastrointestinal: Negative for abdominal pain, blood in stool and vomiting.        No melena or hematemesis   Genitourinary: Negative for hematuria.   Skin: Negative for color change.   Neurological: Negative for headaches.        Physical Exam   Constitutional: No distress.    gentleman in wheelchair   HENT:   Head: Normocephalic and atraumatic.   Mouth/Throat: Oropharynx is clear and moist.   Eyes: Conjunctivae are normal.   Cardiovascular: Normal rate, regular rhythm and normal heart sounds.    Pulmonary/Chest: Effort normal and breath sounds normal.   Abdominal: Soft. Bowel sounds are normal. He exhibits no distension.   Musculoskeletal:   Dressing over surgical wound is dry and clean without obvious drainage.   Neurological: He is alert.   Skin: Skin is warm.   Vitals reviewed.      Labs:  All labs reviewed in the nursing home record.    Assessment:  1. Anemia, unspecified type     2.  Chronic myelomonocytic leukemia     3. Diabetic foot infection     4. Anticoagulation management encounter         Plan: This pleasant 71 yo gentleman with a PMH of CMML, IDDM, recent hospitalization for osteomyelitis of the right foot s/p right transmetatarsal amputation, anemia of chronic disease, and DVT on chronic anticoagulation is seen for anemia. Patient is vitally stable and asymptomatic. Hemoglobin, however, his below transfusion threshold. No obvious source of bleeding. Discussed case with patient's wife Irma.  -Plan to fax results to patient's hematologist. May need to coordinate blood transfusion for tomorrow when [facility] is [facility]  -Recheck INR and CBC   -Stool guaiac x3    45 minutes spent of which greater than 55% was face to face communication with the patient about above plan of care patient was discussed reviewed chart was reviewed and examined with third-year family practice resident Dr. Fred Briones and I agree with his assessment and plan. Fred Odell MD      Electronically signed by: Fred Briones, DO

## 2021-06-26 NOTE — PROGRESS NOTES
Progress Notes by Fred Odell MD at 1/4/2018  2:03 PM     Author: Fred Odell MD Service: -- Author Type: Physician    Filed: 1/4/2018  2:16 PM Encounter Date: 1/4/2018 Status: Signed    : Fred Odell MD (Physician)       Code Status:  FULL CODE  Visit Type: Problem Visit (Boil/diabetes in poor control)     Facility:  [facility] [[id number]]        Facility Type: SNF (Skilled Nursing Facility, TCU)    History of Present Illness: Garcia Lara is a 72 y.o. male who was seen yesterday for significant elevation of blood sugars and was discovered to have boils.  I am seeing back because despite our significant increase in Lantus he still was running sugars in excess of 400 not well ameliorated by the sliding scale.  And calling earlier I had suggested that they increase his Lantus from what I had already increased him to which was 62 units from 55 up to 68 units.  I then encouraged 15  units above the sliding scale despite this intervention his sugars were still high.    Review of Systems     Physical Exam   Constitutional:   Patient is at baseline mental status.  He is having no change in his vital signs were for his blood sugars continue to be refractory high despite additional insulin dosing.  Lungs are clear.  Boil not examined today   Nursing note and vitals reviewed.    result   Visible to patient:  No (Not Released) Next appt:  None Dx:  Cutaneous leishmaniasis         Ref Range & Units 1/4/18  5:26 AM   8/2/16  6:00 AM   6/8/16  6:40 AM   3/28/16  8:45 AM      WBC 4.0 - 11.0 thou/uL 25.6 (H) 7.0 8.6 8.0    RBC 4.40 - 6.20 mill/uL 2.96 (L) 3.59 (L) 3.45 (L) 3.07 (L)    Hemoglobin 14.0 - 18.0 g/dL 11.1 (L) 11.4 (L) 11.0 (L) 9.5 (L)    Hematocrit 40.0 - 54.0 % 32.4 (L) 36.2 (L) 35.5 (L) 32.0 (L)    MCV 80 - 100 fL 110 (H) 101 (H) 103 (H) 104 (H)    MCH 27.0 - 34.0 pg 37.5 (H) 31.8 31.9 30.9    MCHC 32.0 - 36.0 g/dL 34.3 31.5 (L) 31.0 (L) 29.7 (L)    RDW 11.0 - 14.5 % 12.4 14.1  15.9 (H) 21.2 (H)    Platelets 140 - 440 thou/uL 192 233 231 321    MPV 8.5 - 12.5 fL 10.6 10.4 10.6 10.5   [facility]      Specimen Collected: 01/04/18  5:26 AM Last Resulted: 01/04/18 10:24 AM Lab Flowsheet Order Details View Encounter Lab and                 Labs:  All labs reviewed in the nursing home record.  Recent Results (from the past 240 hour(s))   C-Reactive Protein (CRP)    Collection Time: 01/04/18  5:26 AM   Result Value Ref Range    CRP 11.3 (H) 0.0 - 0.8 mg/dL   Sedimentation Rate    Collection Time: 01/04/18  5:26 AM   Result Value Ref Range    Sed Rate 102 (H) 0 - 15 mm/hr   HM1 (CBC with Diff)    Collection Time: 01/04/18  5:26 AM   Result Value Ref Range    WBC 25.6 (H) 4.0 - 11.0 thou/uL    RBC 2.96 (L) 4.40 - 6.20 mill/uL    Hemoglobin 11.1 (L) 14.0 - 18.0 g/dL    Hematocrit 32.4 (L) 40.0 - 54.0 %     (H) 80 - 100 fL    MCH 37.5 (H) 27.0 - 34.0 pg    MCHC 34.3 32.0 - 36.0 g/dL    RDW 12.4 11.0 - 14.5 %    Platelets 192 140 - 440 thou/uL    MPV 10.6 8.5 - 12.5 fL   Manual Differential    Collection Time: 01/04/18  5:26 AM   Result Value Ref Range    Total Neutrophils % 32 (L) 50 - 70 %    Lymphocytes % 30 20 - 40 %    Monocytes % 36 (H) 2 - 10 %    Eosinophils %  0 0 - 6 %    Basophils % 0 0 - 2 %    Metamyelocytes % 2 (H) <=1 %    Myelocytes % 1 <=1 %    Total Neutrophils Absolute 8.2 (H) 2.0 - 7.7 thou/ul    Lymphocytes Absolute 7.7 (H) 0.8 - 4.4 thou/uL    Monocytes Absolute 9.1 (H) 0.0 - 0.9 thou/uL    Eosinophils Absolute 0.0 0.0 - 0.4 thou/uL    Basophils Absolute 0.0 0.0 - 0.2 thou/uL    Metamyelocytes Absolute 0.4 (H) <=0.1 thou/uL    Myelocytes Absolute 0.3 (H) <=0.1 thou/uL    Toxic Granulation 2+ (!) Negative    Platelet Estimate Normal Normal       Assessment:  1. Boils     2. Diabetes mellitus with neuropathy     3. Chronic myelomonocytic leukemia         Plan: Clearly he has elevated inflammatory markers that are probably secondary to the boil.  I do  not think this is completely in due to the malignancy.  At this juncture we are actively treating this and will anticipate follow-up with inflammatory markers in 1 week to include sed rate CRP and CBC with differential.  In regards to the diabetes I am going to increase his Lantus once again as stated in dictation it went from 55-62 we are going to now go up to 68 units at at bedtime.  In addition we are going to increase the sliding scale to as follows 151-205 units, 201-258 units, 251-311 units, 301-3 5014 units, 351-417 units.      25 minutes spent of which greater than 65% was face to face communication with the patient about above plan of care    Electronically signed by: Fred Odell MD

## 2021-06-26 NOTE — PROGRESS NOTES
Progress Notes by Fred Odell MD at 1/10/2018  1:29 PM     Author: Fred Odell MD Service: -- Author Type: Physician    Filed: 1/10/2018  1:37 PM Encounter Date: 1/10/2018 Status: Signed    : Fred Odell MD (Physician)       Code Status:  DNR/DNI  Visit Type: Problem Visit (Hyper glycemia, hematological malignancy)     Facility:  [facility] [[id number]]        Facility Type: SNF (Skilled Nursing Facility, TCU)    History of Present Illness: Garcia Lara is a 72 y.o. male     Review of Systems     Physical Exam   Constitutional:   Patient appears to be a little bit more alert no pain.  Sugars are down but not all the way.  In the middle 200s for the most part.   Vitals reviewed.      Labs:  All labs reviewed in the nursing home record.  Recent Results (from the past 240 hour(s))   C-Reactive Protein (CRP)   Result Value Ref Range    CRP 11.3 (H) 0.0 - 0.8 mg/dL   Sedimentation Rate   Result Value Ref Range    Sed Rate 102 (H) 0 - 15 mm/hr   HM1 (CBC with Diff)   Result Value Ref Range    WBC 25.6 (H) 4.0 - 11.0 thou/uL    RBC 2.96 (L) 4.40 - 6.20 mill/uL    Hemoglobin 11.1 (L) 14.0 - 18.0 g/dL    Hematocrit 32.4 (L) 40.0 - 54.0 %     (H) 80 - 100 fL    MCH 37.5 (H) 27.0 - 34.0 pg    MCHC 34.3 32.0 - 36.0 g/dL    RDW 12.4 11.0 - 14.5 %    Platelets 192 140 - 440 thou/uL    MPV 10.6 8.5 - 12.5 fL   Manual Differential   Result Value Ref Range    Total Neutrophils % 32 (L) 50 - 70 %    Lymphocytes % 30 20 - 40 %    Monocytes % 36 (H) 2 - 10 %    Eosinophils %  0 0 - 6 %    Basophils % 0 0 - 2 %    Metamyelocytes % 2 (H) <=1 %    Myelocytes % 1 <=1 %    Total Neutrophils Absolute 8.2 (H) 2.0 - 7.7 thou/ul    Lymphocytes Absolute 7.7 (H) 0.8 - 4.4 thou/uL    Monocytes Absolute 9.1 (H) 0.0 - 0.9 thou/uL    Eosinophils Absolute 0.0 0.0 - 0.4 thou/uL    Basophils Absolute 0.0 0.0 - 0.2 thou/uL    Metamyelocytes Absolute 0.4 (H) <=0.1 thou/uL    Myelocytes Absolute 0.3 (H) <=0.1  thou/uL    Toxic Granulation 2+ (!) Negative    Platelet Estimate Normal Normal   C-Reactive Protein (CRP)   Result Value Ref Range    CRP 14.2 (H) 0.0 - 0.8 mg/dL   HM1 (CBC with Diff)   Result Value Ref Range    WBC 44.5 (HH) 4.0 - 11.0 thou/uL    RBC 2.60 (L) 4.40 - 6.20 mill/uL    Hemoglobin 9.8 (L) 14.0 - 18.0 g/dL    Hematocrit 30.2 (L) 40.0 - 54.0 %     (H) 80 - 100 fL    MCH 37.7 (H) 27.0 - 34.0 pg    MCHC 32.5 32.0 - 36.0 g/dL    RDW 12.5 11.0 - 14.5 %    Platelets 201 140 - 440 thou/uL    MPV 10.7 8.5 - 12.5 fL   Manual Differential   Result Value Ref Range    Total Neutrophils % 42 (L) 50 - 70 %    Lymphocytes % 22 20 - 40 %    Monocytes % 26 (H) 2 - 10 %    Eosinophils %  1 0 - 6 %    Basophils % 0 0 - 2 %    Metamyelocytes % 4 (H) <=1 %    Myelocytes % 6 (H) <=1 %    Promyelocytes % 1 (H) <=0 %    Total Neutrophils Absolute 18.7 (H) 2.0 - 7.7 thou/ul    Lymphocytes Absolute 9.6 (H) 0.8 - 4.4 thou/uL    Monocytes Absolute 11.6 (H) 0.0 - 0.9 thou/uL    Eosinophils Absolute 0.2 0.0 - 0.4 thou/uL    Basophils Absolute 0.0 0.0 - 0.2 thou/uL    Metamyelocytes Absolute 1.6 (H) <=0.1 thou/uL    Myelocytes Absolute 2.7 (H) <=0.1 thou/uL    Promyelocytes Absolute 0.2 (H) <=0.1 thou/uL    Platelet Estimate Normal Normal    Ovalocytes 1+ (!) Negative    Polychromasia 1+ (!) Negative       Manual Differential   Order: [id number] - Part of Panel Order [id number]   Status:  Final result   Visible to patient:  No (Not Released) Next appt:  None Dx:  Elevated C-reactive protein (CRP); Ot...         Ref Range & Units 1/10/18  6:35 AM   1/4/18  5:26 AM      Total Neutrophils % 50 - 70 % 42 (L) 32 (L)    Lymphocytes % 20 - 40 % 22 30    Monocytes % 2 - 10 % 26 (H) 36 (H)    Eosinophils %  0 - 6 % 1 0    Basophils % 0 - 2 % 0 0    Metamyelocytes % <=1 % 4 (H) 2 (H)    Myelocytes % <=1 % 6 (H) 1    Promyelocytes % <=0 % 1 (H)     Total Neutrophils Absolute 2.0 - 7.7 thou/ul 18.7 (H) 8.2 (H)    Lymphocytes Absolute 0.8 -  4.4 thou/uL 9.6 (H) 7.7 (H)    Monocytes Absolute 0.0 - 0.9 thou/uL 11.6 (H) 9.1 (H)    Eosinophils Absolute 0.0 - 0.4 thou/uL 0.2 0.0    Basophils Absolute 0.0 - 0.2 thou/uL 0.0 0.0    Metamyelocytes Absolute <=0.1 thou/uL 1.6 (H) 0.4 (H)    Myelocytes Absolute <=0.1 thou/uL 2.7 (H) 0.3 (H)    Promyelocytes Absolute <=0.1 thou/uL 0.2 (H)     Platelet Estimate Normal Normal Normal    Ovalocytes Negative 1+ (!)     Polychromasia Negative 1+ (!)     Toxic Granulation   2+ (!)   [facility]ing Agency  [facility]   Narrative   Toxic Neutrophils present.    Neutrophils show changes               Sedimentation Rate  In process 1/10/2018                        HM1 (CBC with Diff)   Order: [id number] - Part of Panel Order [id number]   Status:  Final result   Visible to patient:  No (Not Released) Next appt:  None Dx:  Elevated C-reactive protein (CRP); Ot...         Ref Range & Units 1/10/18  6:35 AM   1/4/18  5:26 AM      WBC 4.0 - 11.0 thou/uL 44.5 (44HH) 25.6 (H)    RBC 4.40 - 6.20 mill/uL 2.60 (L) 2.96 (L)    Hemoglobin 14.0 - 18.0 g/dL 9.8 (L) 11.1 (L)    Hematocrit 40.0 - 54.0 % 30.2 (L) 32.4 (L)    MCV 80 - 100 fL 116 (H) 110 (H)    MCH 27.0 - 34.0 pg 37.7 (H) 37.5 (H)    MCHC 32.0 - 36.0 g/dL 32.5 34.3    RDW 11.0 - 14.5 % 12.5 12.4    Platelets 140 - 440 thou/uL 201 192    MPV 8.5 - 12.5 fL 10.7 10.6   [facility]ing Agency  [facility]      Specimen Collected: 01/10/18  6:35 AM Last Resulted: 01/10/18 12:44 PM Lab Flowsheet Order Details View Encounter Lab and Collection Details Routing Result History         Related Result Highlights       Sedimentation Rate  In process                     Assessment:  1. Boils     2. Diabetes mellitus with neuropathy     3. Chronic myelomonocytic leukemia         Plan: Initially I felt that the elevated inflammatory markers were secondary to the boils but at this juncture I do feel it is mostly secondary to the chronic mild low Asa ascitic leukemia.  At this juncture we will contact oncology about  their input on this.  In the interim continue finishing up the treatment for the boils which are looking better and they did not take my order from prior so we will do the split 68 units at bedtime 8 units in the a.m. for the diabetes management will follow along and see how this goes.      25 minutes spent of which greater than 65% was face to face communication with the patient about above plan of care    Electronically signed by: Fred Odell MD

## 2023-11-01 NOTE — PROGRESS NOTES
Can you look into this did patient need a prior authorization for this medication   [facility]    Facility[facility]   [facility] [[id number]]   Code Status: DNR/DNI      CHIEF COMPLAINT/REASON FOR VISIT:  Chief Complaint   Patient presents with     Problem Visit     Anticoagulation Management       HISTORY:      HPI: Garcia is a 72 y.o. male with multiple medical problems including anemia, CMML, DMII, DVT, agent orange exposure, pneumonia, and UTI. He is currently being anticoagulated for history of DVT. He had his INR checked today at the facility and it was at goal. He denies any uncontrolled bleeding, any active bleeding, any bruising, hematuria, epistaxis, blood in stools or black/dark/tarry stools. No other concerns noted.     Past Medical History:   Diagnosis Date     Anemia 10/14/2015     Bacteremia 10/14/2015     CMML (chronic myelomonocytic leukemia)      Diabetes mellitus     type 2-insulin dependent     Diabetic peripheral neuropathy associated with type 2 diabetes mellitus      DVT (deep venous thrombosis) 10/14/2015    left L/E     Dysphagia 10/14/2015     Encephalopathy 10/14/2015     Foot drop, right 10/14/2015     Hematoma of muscle 11/10/15    Right psoas     History of agent Orange exposure     during [employer] war     History of angina pectoris      Hyperlipidemia      Hypertension      MDS (myelodysplastic syndrome)      Non-ischemic cardiomyopathy      Pneumonia      Sphenoid sinusitis      UTI (urinary tract infection) 10/14/2014    KLEBSIELLA             Family History   Problem Relation Age of Onset     Asthma Sister      Heart disease Brother      Asthma Son      Heart disease Brother      Social History     Social History     Marital status:      Spouse name: N/A     Number of children: N/A     Years of education: N/A     Social History Main Topics     Smoking status: Never Smoker     Smokeless tobacco: Never Used     Alcohol use No     Drug use: No     Sexual activity: Not Currently     Other Topics Concern     None     Social History  Narrative       REVIEW OF SYSTEM:  Pertinent items are noted in HPI.    PHYSICAL EXAM:   /87  Pulse 88  Temp 98.3  F (36.8  C)  Resp 18  Wt (!) 238 lb 12.8 oz (108.3 kg)  SpO2 97%  BMI 34.26 kg/m2  General appearance: alert, appears stated age and cooperative  Head: Normocephalic, without obvious abnormality, atraumatic  Lungs: respirations without effort  Skin: Skin color, texture, turgor normal. No rashes or lesions  Neurologic: Grossly normal      LABS:   INR in one week.     ASSESSMENT:      ICD-10-CM    1. Anticoagulation management encounter Z51.81     Z79.01        PLAN:    Anticoagulation Management  -INR at goal today, continue current coumadin dose (5.5 mg by mouth M/W/F/Saturday and 5 mg all other days).   -Monitor for bleeding or any other concerns.   -Recheck INR in one week.     Greater than 15 minutes spent with patient with at least 55% of this time spent on counseling, education, and discussion of the above care plan with nursing staff, and patient.     Electronically signed by: Julianne Rogers CNP